# Patient Record
Sex: FEMALE | Race: WHITE | NOT HISPANIC OR LATINO | Employment: UNEMPLOYED | URBAN - METROPOLITAN AREA
[De-identification: names, ages, dates, MRNs, and addresses within clinical notes are randomized per-mention and may not be internally consistent; named-entity substitution may affect disease eponyms.]

---

## 2017-01-05 ENCOUNTER — HOSPITAL ENCOUNTER (EMERGENCY)
Facility: HOSPITAL | Age: 32
Discharge: HOME | End: 2017-01-05
Attending: EMERGENCY MEDICINE
Payer: MEDICAID

## 2017-01-05 VITALS
OXYGEN SATURATION: 99 % | RESPIRATION RATE: 17 BRPM | HEIGHT: 68 IN | SYSTOLIC BLOOD PRESSURE: 123 MMHG | TEMPERATURE: 99 F | DIASTOLIC BLOOD PRESSURE: 73 MMHG | BODY MASS INDEX: 25.76 KG/M2 | HEART RATE: 82 BPM | WEIGHT: 170 LBS

## 2017-01-05 DIAGNOSIS — N39.0 URINARY TRACT INFECTION, SITE UNSPECIFIED: ICD-10-CM

## 2017-01-05 DIAGNOSIS — R10.9 ABDOMINAL PAIN, UNSPECIFIED LOCATION: Primary | ICD-10-CM

## 2017-01-05 DIAGNOSIS — R11.10 VOMITING AND DIARRHEA: ICD-10-CM

## 2017-01-05 DIAGNOSIS — R19.7 VOMITING AND DIARRHEA: ICD-10-CM

## 2017-01-05 LAB
ALBUMIN SERPL BCP-MCNC: 4.8 G/DL
ALP SERPL-CCNC: 78 U/L
ALT SERPL W/O P-5'-P-CCNC: 29 U/L
ANION GAP SERPL CALC-SCNC: 14 MMOL/L
AST SERPL-CCNC: 17 U/L
B-HCG UR QL: NEGATIVE
BACTERIA #/AREA URNS HPF: ABNORMAL /HPF
BASOPHILS # BLD AUTO: 0.03 K/UL
BASOPHILS NFR BLD: 0.1 %
BILIRUB SERPL-MCNC: 0.5 MG/DL
BILIRUB UR QL STRIP: NEGATIVE
BUN SERPL-MCNC: 8 MG/DL
CALCIUM SERPL-MCNC: 10.4 MG/DL
CHLORIDE SERPL-SCNC: 106 MMOL/L
CLARITY UR: ABNORMAL
CO2 SERPL-SCNC: 20 MMOL/L
COLOR UR: YELLOW
CREAT SERPL-MCNC: 1 MG/DL
CTP QC/QA: YES
DIFFERENTIAL METHOD: ABNORMAL
EOSINOPHIL # BLD AUTO: 0 K/UL
EOSINOPHIL NFR BLD: 0 %
ERYTHROCYTE [DISTWIDTH] IN BLOOD BY AUTOMATED COUNT: 14.9 %
EST. GFR  (AFRICAN AMERICAN): >60 ML/MIN/1.73 M^2
EST. GFR  (NON AFRICAN AMERICAN): >60 ML/MIN/1.73 M^2
GLUCOSE SERPL-MCNC: 110 MG/DL
GLUCOSE UR QL STRIP: NEGATIVE
HCT VFR BLD AUTO: 44.4 %
HGB BLD-MCNC: 15.3 G/DL
HGB UR QL STRIP: ABNORMAL
HYALINE CASTS #/AREA URNS LPF: 0 /LPF
KETONES UR QL STRIP: ABNORMAL
LEUKOCYTE ESTERASE UR QL STRIP: ABNORMAL
LIPASE SERPL-CCNC: 19 U/L
LYMPHOCYTES # BLD AUTO: 2 K/UL
LYMPHOCYTES NFR BLD: 8.7 %
MCH RBC QN AUTO: 31.2 PG
MCHC RBC AUTO-ENTMCNC: 34.5 %
MCV RBC AUTO: 91 FL
MICROSCOPIC COMMENT: ABNORMAL
MONOCYTES # BLD AUTO: 1.4 K/UL
MONOCYTES NFR BLD: 6 %
NEUTROPHILS # BLD AUTO: 19.9 K/UL
NEUTROPHILS NFR BLD: 85.2 %
NITRITE UR QL STRIP: NEGATIVE
PH UR STRIP: 8 [PH] (ref 5–8)
PLATELET # BLD AUTO: 388 K/UL
PMV BLD AUTO: 11.4 FL
POTASSIUM SERPL-SCNC: 3.7 MMOL/L
PROT SERPL-MCNC: 8.7 G/DL
PROT UR QL STRIP: ABNORMAL
RBC # BLD AUTO: 4.9 M/UL
RBC #/AREA URNS HPF: 12 /HPF (ref 0–4)
SODIUM SERPL-SCNC: 140 MMOL/L
SP GR UR STRIP: 1.02 (ref 1–1.03)
SQUAMOUS #/AREA URNS HPF: 10 /HPF
URN SPEC COLLECT METH UR: ABNORMAL
UROBILINOGEN UR STRIP-ACNC: ABNORMAL EU/DL
WBC # BLD AUTO: 23.36 K/UL
WBC #/AREA URNS HPF: 5 /HPF (ref 0–5)
YEAST URNS QL MICRO: ABNORMAL

## 2017-01-05 PROCEDURE — 87186 SC STD MICRODIL/AGAR DIL: CPT

## 2017-01-05 PROCEDURE — 96365 THER/PROPH/DIAG IV INF INIT: CPT

## 2017-01-05 PROCEDURE — 87088 URINE BACTERIA CULTURE: CPT

## 2017-01-05 PROCEDURE — 80053 COMPREHEN METABOLIC PANEL: CPT

## 2017-01-05 PROCEDURE — 96368 THER/DIAG CONCURRENT INF: CPT

## 2017-01-05 PROCEDURE — 81025 URINE PREGNANCY TEST: CPT | Performed by: EMERGENCY MEDICINE

## 2017-01-05 PROCEDURE — 81000 URINALYSIS NONAUTO W/SCOPE: CPT

## 2017-01-05 PROCEDURE — 63600175 PHARM REV CODE 636 W HCPCS: Performed by: PHYSICIAN ASSISTANT

## 2017-01-05 PROCEDURE — 87086 URINE CULTURE/COLONY COUNT: CPT

## 2017-01-05 PROCEDURE — 85025 COMPLETE CBC W/AUTO DIFF WBC: CPT

## 2017-01-05 PROCEDURE — 25000003 PHARM REV CODE 250: Performed by: EMERGENCY MEDICINE

## 2017-01-05 PROCEDURE — 83690 ASSAY OF LIPASE: CPT

## 2017-01-05 PROCEDURE — 63600175 PHARM REV CODE 636 W HCPCS: Performed by: EMERGENCY MEDICINE

## 2017-01-05 PROCEDURE — 96361 HYDRATE IV INFUSION ADD-ON: CPT

## 2017-01-05 PROCEDURE — 25500020 PHARM REV CODE 255: Performed by: EMERGENCY MEDICINE

## 2017-01-05 PROCEDURE — 99284 EMERGENCY DEPT VISIT MOD MDM: CPT | Mod: 25

## 2017-01-05 PROCEDURE — 87077 CULTURE AEROBIC IDENTIFY: CPT

## 2017-01-05 PROCEDURE — 25000003 PHARM REV CODE 250: Performed by: PHYSICIAN ASSISTANT

## 2017-01-05 RX ORDER — ONDANSETRON 2 MG/ML
4 INJECTION INTRAMUSCULAR; INTRAVENOUS
Status: COMPLETED | OUTPATIENT
Start: 2017-01-05 | End: 2017-01-05

## 2017-01-05 RX ORDER — METRONIDAZOLE 500 MG/100ML
500 INJECTION, SOLUTION INTRAVENOUS
Status: COMPLETED | OUTPATIENT
Start: 2017-01-05 | End: 2017-01-05

## 2017-01-05 RX ORDER — HYDROMORPHONE HYDROCHLORIDE 2 MG/ML
1 INJECTION, SOLUTION INTRAMUSCULAR; INTRAVENOUS; SUBCUTANEOUS
Status: COMPLETED | OUTPATIENT
Start: 2017-01-05 | End: 2017-01-05

## 2017-01-05 RX ORDER — DICYCLOMINE HYDROCHLORIDE 20 MG/1
20 TABLET ORAL 2 TIMES DAILY
Qty: 20 TABLET | Refills: 0 | Status: SHIPPED | OUTPATIENT
Start: 2017-01-05 | End: 2017-02-04

## 2017-01-05 RX ORDER — PROMETHAZINE HYDROCHLORIDE 25 MG/1
25 TABLET ORAL EVERY 6 HOURS PRN
Qty: 15 TABLET | Refills: 0 | Status: SHIPPED | OUTPATIENT
Start: 2017-01-05 | End: 2017-01-25

## 2017-01-05 RX ORDER — CIPROFLOXACIN 2 MG/ML
400 INJECTION, SOLUTION INTRAVENOUS
Status: COMPLETED | OUTPATIENT
Start: 2017-01-05 | End: 2017-01-05

## 2017-01-05 RX ORDER — CEPHALEXIN 500 MG/1
500 CAPSULE ORAL 4 TIMES DAILY
Qty: 28 CAPSULE | Refills: 0 | Status: SHIPPED | OUTPATIENT
Start: 2017-01-05 | End: 2017-01-12

## 2017-01-05 RX ADMIN — PROMETHAZINE HYDROCHLORIDE 12.5 MG: 25 INJECTION INTRAMUSCULAR; INTRAVENOUS at 02:01

## 2017-01-05 RX ADMIN — IOHEXOL 70 ML: 350 INJECTION, SOLUTION INTRAVENOUS at 03:01

## 2017-01-05 RX ADMIN — SODIUM CHLORIDE 1000 ML: 0.9 INJECTION, SOLUTION INTRAVENOUS at 01:01

## 2017-01-05 RX ADMIN — CIPROFLOXACIN 400 MG: 2 INJECTION, SOLUTION INTRAVENOUS at 02:01

## 2017-01-05 RX ADMIN — METRONIDAZOLE 500 MG: 500 INJECTION, SOLUTION INTRAVENOUS at 03:01

## 2017-01-05 RX ADMIN — ONDANSETRON 4 MG: 2 INJECTION INTRAMUSCULAR; INTRAVENOUS at 01:01

## 2017-01-05 RX ADMIN — HYDROMORPHONE HYDROCHLORIDE 1 MG: 2 INJECTION, SOLUTION INTRAMUSCULAR; INTRAVENOUS; SUBCUTANEOUS at 02:01

## 2017-01-05 NOTE — ED AVS SNAPSHOT
[facility]  [place] [address]               Alena Willis   2017  1:28 AM   ED    Description:  Female : 1985   Department:  [facility]           Your Care was Coordinated By:     Provider Role From To    Ruth Yen MD Attending Provider 17 012 --    DANN Nance Physician Assistant 17 --      Reason for Visit     Abdominal Pain           Diagnoses this Visit        Comments    Abdominal pain, unspecified location    -  Primary     Vomiting and diarrhea         Urinary tract infection, site unspecified           ED Disposition     None           To Do List           Follow-up Information     Follow up with Keena Pillai NP.    Specialty:  Hospitalist    Contact information:    [facility][note 3] [place] [id number] [phone]         These Medications        Disp Refills Start End    cephALEXin (KEFLEX) 500 MG capsule 28 capsule 0 2017    Take 1 capsule (500 mg total) by mouth 4 (four) times daily. - Oral    Pharmacy: [facility] [place] [address] [address] Ph #: [phone]       promethazine (PHENERGAN) 25 MG tablet 15 tablet 0 2017     Take 1 tablet (25 mg total) by mouth every 6 (six) hours as needed for Nausea. - Oral    Ph[facility] [place] [address] [facility] Ph #: [phone]       dicyclomine (BENTYL) 20 mg tablet 20 tablet 0 2017    Take 1 tablet (20 mg total) by mouth 2 (two) times daily. - Oral    Pharmacy: [facility] [place] [address] [address] Ph #: [phone]         [facility]     [facility] -  Assistance  Registered nurses in the [facility] provide clinical advisement, health education, appointment booking, and other advisory services.  Call for this free service at [phone].             Medications            Message regarding Medications     Verify the changes and/or additions to your medication regime listed below are the same as discussed with your clinician today.  If any of these changes or additions are incorrect, please notify your healthcare provider.        START taking these NEW medications        Refills    cephALEXin (KEFLEX) 500 MG capsule 0    Sig: Take 1 capsule (500 mg total) by mouth 4 (four) times daily.    Class: Print    Route: Oral    promethazine (PHENERGAN) 25 MG tablet 0    Sig: Take 1 tablet (25 mg total) by mouth every 6 (six) hours as needed for Nausea.    Class: Print    Route: Oral    dicyclomine (BENTYL) 20 mg tablet 0    Sig: Take 1 tablet (20 mg total) by mouth 2 (two) times daily.    Class: Print    Route: Oral      These medications were administered today        Dose Freq    sodium chloride 0.9% bolus 1,000 mL 1,000 mL ED 1 Time    Sig: Inject 1,000 mLs into the vein ED 1 Time.    Class: Normal    Route: Intravenous    ondansetron injection 4 mg 4 mg ED 1 Time    Sig: Inject 4 mg into the vein ED 1 Time.    Class: Normal    Route: Intravenous    ciprofloxacin (CIPRO)400mg/200ml D5W IVPB 400 mg 400 mg ED 1 Time    Sig: Inject 200 mLs (400 mg total) into the vein ED 1 Time.    Class: Normal    Route: Intravenous    Cosign for Ordering: Accepted by Ruth Yen MD on 1/5/2017  3:12 AM    metronidazole IVPB 500 mg 500 mg ED 1 Time    Sig: Inject 100 mLs (500 mg total) into the vein ED 1 Time.    Class: Normal    Route: Intravenous    Cosign for Ordering: Accepted by Ruth Yen MD on 1/5/2017  3:12 AM    hydromorphone (PF) injection 1 mg 1 mg ED 1 Time    Sig: Inject 0.5 mLs (1 mg total) into the vein ED 1 Time.    Class: Normal    Route: Intravenous    Cosign for Ordering: Accepted by Ruth Yen MD on 1/5/2017  3:12 AM    promethazine (PHENERGAN) 12.5 mg in dextrose 5 % 50 mL IVPB 12.5 mg ED 1 Time    Sig: Inject 12.5 mg into the vein ED 1 Time.    Class: Normal  "   Route: Intravenous    Cosign for Ordering: Accepted by Ruth Yen MD on 1/5/2017  3:12 AM    omnipaque 350 iohexol 70 mL 70 mL IMG once as needed    Sig: Inject 70 mLs into the vein ONCE PRN for contrast.    Class: Normal    Route: Intravenous      STOP taking these medications     promethazine (PHENERGAN) 25 MG suppository Place 1 suppository (25 mg total) rectally every 6 (six) hours as needed for Nausea.           Verify that the below list of medications is an accurate representation of the medications you are currently taking.  If none reported, the list may be blank. If incorrect, please contact your healthcare provider. Carry this list with you in case of emergency.           Current Medications     alprazolam (XANAX) 0.5 MG tablet Take 1 tablet (0.5 mg total) by mouth 3 (three) times daily as needed for Anxiety.    cephALEXin (KEFLEX) 500 MG capsule Take 1 capsule (500 mg total) by mouth 4 (four) times daily.    dicyclomine (BENTYL) 20 mg tablet Take 1 tablet (20 mg total) by mouth 2 (two) times daily.    methocarbamol (ROBAXIN) 500 MG Tab Take 500 mg by mouth 4 (four) times daily.    metronidazole IVPB 500 mg Inject 100 mLs (500 mg total) into the vein ED 1 Time.    ondansetron (ZOFRAN) 4 MG tablet Take 1 tablet (4 mg total) by mouth every 8 (eight) hours as needed for Nausea.    ondansetron (ZOFRAN) 4 MG tablet Take 1 tablet (4 mg total) by mouth every 8 (eight) hours as needed.    oxycodone-acetaminophen (PERCOCET) 5-325 mg per tablet Take 1 tablet by mouth every 4 (four) hours as needed for Pain.    promethazine (PHENERGAN) 25 MG tablet Take 1 tablet (25 mg total) by mouth every 6 (six) hours as needed for Nausea.    tramadol (ULTRAM) 50 mg tablet Take 50 mg by mouth every 6 (six) hours as needed for Pain.           Clinical Reference Information           Your Vitals Were     BP Pulse Temp Resp Height Weight    124/73 83 99 °F (37.2 °C) (Oral) 17 5' 8" (1.727 m) 77.1 kg (170 lb)    SpO2 BMI " "            99% 25.85 kg/m2         Allergies as of 1/5/2017        Reactions    Percocet [Oxycodone-acetaminophen] Other (See Comments)    nausea, abdominal cramping - "just doesn't like taking it" - patient took morning of 2/2/2016      Immunizations Administered on Date of Encounter - 1/5/2017     None      ED Micro, Lab, POCT     Start Ordered       Status Ordering Provider    01/05/17 0207 01/05/17 0206  Urine culture  Once      In process     01/05/17 0032 01/05/17 0032  Comprehensive metabolic panel  STAT      Final result     01/05/17 0032 01/05/17 0032  CBC auto differential  STAT      Final result     01/05/17 0032 01/05/17 0032  Urinalysis  STAT      Final result     01/05/17 0032 01/05/17 0032  Lipase  STAT      Final result     01/05/17 0032 01/05/17 0032  POCT urine pregnancy  Once      Final result     01/05/17 0032 01/05/17 0032  Urinalysis Microscopic  Once      Final result       ED Imaging Orders     Start Ordered       Status Ordering Provider    01/05/17 0220 01/05/17 0220  CT Abdomen Pelvis With Contrast  1 time imaging      Final result         Discharge Instructions         Abdominal Pain    Abdominal pain is pain in the stomach or belly area. Everyone has this pain from time to time. In many cases it goes away on its own. But abdominal pain can sometimes be due to a serious problem, such as appendicitis. So its important to know when to seek help.  Causes of abdominal pain  There are many possible causes of abdominal pain. Common causes in adults include:  · Constipation, diarrhea, or gas  · Stomach acid flowing back up into the esophagus (acid reflux or heartburn)  · Severe acid reflux, called GERD (gastroesophageal reflux disease)  · A sore in the lining of the stomach or small intestine (peptic ulcer)  · Inflammation of the gallbladder, liver, or pancreas  · Gallstones or kidney stones  · Appendicitis   · Intestinal blockage   · An internal organ pushing through a muscle or other tissue " (hernia)  · Urinary tract infections  · In women, menstrual cramps, fibroids, or endometriosis  · Inflammation or infection of the intestines  Diagnosing the cause of abdominal pain  Your healthcare provider will do a physical exam help find the cause of your pain. If needed, tests will be ordered. Belly pain has many possible causes. So it can be hard to find the reason for your pain. Giving details about your pain can help. Tell your provider where and when you feel the pain, and what makes it better or worse. Also let your provider know if you have other symptoms such as:  · Fever  · Tiredness  · Upset stomach (nausea)  · Vomiting  · Changes in bathroom habits  Treating abdominal pain  Some causes of pain need emergency medical treatment right away. These include appendicitis or a bowel blockage. Other problems can be treated with rest, fluids, or medicines. Your healthcare provider can give you specific instructions for treatment or self-care based on what is causing your pain.  If you have vomiting or diarrhea, sip water or other clear fluids. When you are ready to eat solid foods again, start with small amounts of easy-to-digest, low-fat foods. These include apple sauce, toast, or crackers.   When to seek medical care  Call 911 or go to the hospital right away if you:  · Cant pass stool and are vomiting  · Are vomiting blood or have bloody diarrhea or black, tarry diarrhea  · Have chest, neck, or shoulder pain  · Feel like you might pass out  · Have pain in your shoulder blades with nausea  · Have sudden, severe belly pain  · Have new, severe pain unlike any you have felt before  · Have a belly that is rigid, hard, and tender to touch  Call your healthcare provider if you have:  · Pain for more than 5 days  · Bloating for more than 2 days  · Diarrhea for more than 5 days  · A fever of 100.4°F (38.0°C) or higher, or as directed by your provider  · Pain that gets worse  · Weight loss for no reason  · Continued  lack of appetite  · Blood in your stool  How to prevent abdominal pain  Here are some tips to help prevent abdominal pain:  · Eat smaller amounts of food at one time.  · Avoid greasy, fried, or other high-fat foods.  · Avoid foods that give you gas.  · Exercise regularly.  · Drink plenty of fluids.  To help prevent GERD symptoms:  · Quit smoking.  · Reduce alcohol and certain foods that increase stomach acid.  · Avoid aspirin and over-the-counter pain and fever medicines (NSAIDS or nonsteroidal anti-inflammatory drugs), if possible  · Lose extra weight.  · Finish eating at least 2 hours before you go to bed or lie down.  · Raise the head of your bed.  © 6183-2184 [employer]. [address] [place] [address], [place] [address]. All rights reserved. This information is not intended as a substitute for professional medical care. Always follow your healthcare professional's instructions.          Discharge References/Attachments     URINARY TRACT INFECTIONS IN WOMEN (ENGLISH)      [facility] Sign-Up     Activating your [facility] account is as easy as 1-2-3!     1) Visit [email or web address], select Sign Up Now, enter this activation code and your date of birth, then select Next.  [email or web address]  Expires: 2/19/2017  3:55 AM      2) Create a username and password to use when you visit [facility] in the future and select a security question in case you lose your password and select Next.    3) Enter your e-mail address and click Sign Up!    Additional Information  If you have questions, please e-mail [email or web address] or call [phone] to talk to our [facility] staff. Remember, [facility] is NOT to be used for urgent needs. For medical emergencies, dial 911.         Smoking Cessation     If you would like to quit smoking:   You may be eligible for free services if you are a [place] resident and started smoking cigarettes before September 1, 1988.  Call the Smoking Cessation Trust (SCT) toll free at (517) 797-4395  or (118) 997-4137.   Call 1-800-QUIT-NOW if you do not meet the above criteria.             [facility] complies with applicable Federal civil rights laws and does not discriminate on the basis of race, color, national origin, age, disability, or sex.        Language Assistance Services     ATTENTION: Language assistance services are available, free of charge. Please call [phone].      ATENCIÓN: Si habla español, tiene a coello disposición servicios gratuitos de asistencia lingüística. Llame al [phone].     CHÚ Ý: N?u b?n nói Ti?ng Vi?t, có các d?ch v? h? tr? ngôn ng? mi?n phí dành cho b?n. G?i s? [phone].

## 2017-01-05 NOTE — ED PROVIDER NOTES
"Encounter Date: 1/5/2017    SCRIBE #1 NOTE: I, Fred Julien, am scribing for, and in the presence of,  DANN Myers. I have scribed the following portions of the note - Other sections scribed: HPI, ROS.       History     Chief Complaint   Patient presents with    Abdominal Pain     Abd pain, vomiting, diarrhea, shaking X 3 days. Has hx of diverticulitis. Hands cramping     Review of patient's allergies indicates:   Allergen Reactions    Percocet [oxycodone-acetaminophen] Other (See Comments)     nausea, abdominal cramping - "just doesn't like taking it" - patient took morning of 2/2/2016       HPI Comments: CC: Abdominal Pain    HPI: 31 year old female with a history of diverticulitis and cervical cancer presents to the ED complaining of abdominal pain with nausea, vomiting, and diarrhea for about 3 days.  She also complains of dysuria for approximately 1 week. She states her abdominal pain is moderate 5/10 and worse with palpation. She otherwise denies fever, chills, chest pain, shortness of breath, constipation, and blood in her stool at this time. Symptoms are acute and constant. No prior treatment.    The history is provided by the patient.     Past Medical History   Diagnosis Date    Anxiety     Depression     History of diverticulitis     IBS (irritable bowel syndrome)      No past medical history pertinent negatives.  Past Surgical History   Procedure Laterality Date    Ovarian cyst removal       Family History   Problem Relation Age of Onset    Breast cancer Neg Hx     Colon cancer Neg Hx     Ovarian cancer Neg Hx      Social History   Substance Use Topics    Smoking status: Current Every Day Smoker     Packs/day: 1.00     Years: 14.00     Types: Cigarettes    Smokeless tobacco: Never Used    Alcohol use No     Review of Systems   Constitutional: Negative for chills and fever.   HENT: Negative for sore throat.    Eyes: Negative for visual disturbance.   Respiratory: Negative for " shortness of breath.    Cardiovascular: Negative for chest pain.   Gastrointestinal: Positive for abdominal pain, diarrhea, nausea and vomiting. Negative for blood in stool and constipation.   Genitourinary: Positive for dysuria.   Musculoskeletal: Negative for back pain.   Neurological: Negative for headaches.       Physical Exam   Initial Vitals   BP Pulse Resp Temp SpO2   01/05/17 0018 01/05/17 0018 01/05/17 0018 01/05/17 0018 01/05/17 0018   123/71 84 18 99 °F (37.2 °C) 96 %     Physical Exam    Constitutional: She appears well-developed and well-nourished.   HENT:   Head: Normocephalic.   Right Ear: External ear normal.   Left Ear: External ear normal.   Mouth/Throat: Oropharynx is clear and moist.   Eyes: Conjunctivae and EOM are normal. Pupils are equal, round, and reactive to light.   Neck: Normal range of motion. Neck supple.   Cardiovascular: Normal rate, regular rhythm, normal heart sounds and intact distal pulses.   Pulmonary/Chest: Breath sounds normal. No respiratory distress.   Abdominal: Soft. There is tenderness (left lower quadrant abdominal tenderness). There is no rebound and no guarding.   Neurological: She is alert and oriented to person, place, and time. She has normal strength.   Skin: Skin is warm.   Psychiatric: She has a normal mood and affect.         ED Course   Procedures  Labs Reviewed   COMPREHENSIVE METABOLIC PANEL - Abnormal; Notable for the following:        Result Value    CO2 20 (*)     Total Protein 8.7 (*)     All other components within normal limits   CBC W/ AUTO DIFFERENTIAL - Abnormal; Notable for the following:     WBC 23.36 (*)     MCH 31.2 (*)     RDW 14.9 (*)     Platelets 388 (*)     Gran # 19.9 (*)     Mono # 1.4 (*)     Gran% 85.2 (*)     Lymph% 8.7 (*)     All other components within normal limits   URINALYSIS - Abnormal; Notable for the following:     Appearance, UA Hazy (*)     Protein, UA 1+ (*)     Ketones, UA 2+ (*)     Occult Blood UA 1+ (*)     Urobilinogen,  UA 2.0-3.0 (*)     Leukocytes, UA 1+ (*)     All other components within normal limits   URINALYSIS MICROSCOPIC - Abnormal; Notable for the following:     RBC, UA 12 (*)     Bacteria, UA Many (*)     All other components within normal limits   CULTURE, URINE   LIPASE   POCT URINE PREGNANCY          X-Rays:   Independently Interpreted Readings:   Other Readings:  CT Abdomen Pelvis With Contrast (Final result) Result time: 01/05/17 03:22:22    Final result by Karlos Valiente MD (01/05/17 03:22:22)    Impression:       No CT evidence of acute diverticulitis.    No acute intra-abdominal or pelvic process.    Stable hepatic lesion.              Electronically signed by: KARLOS VALIENTE MD  Date: 01/05/17  Time: 03:22     Narrative:    Exam: [id number] 01/05/17  02:22:46 [id number] ([facility]) : CT ABDOMEN PELVIS WITH CONTRAST    Technique:     Axial CT Scan of the abdomen and pelvis was performed from the lung base to the public symphysis after the intravenous administration of  75 cc of Omni 350. Coronal and Sagittal reformats were obtained.     Comparison:    6/14/15 and prior examinations.    Findings:      The lung bases are within normal limits.  The heart is normal in appearance.  The vessels are unremarkable.  There is no evidence of lymphadenopathy.    The esophagus, stomach, gallbladder, and the small bowel are within normal limits.  The appendix is not consistently identified.  There are no secondary findings of acute appendicitis.  The large bowel is unremarkable with scattered colonic diverticula.  There is no evidence of acute diverticulitis.    There is a stable enhancing lesion in segment IVb of the liver.  The gallbladder and biliary tree are within normal limits.  The spleen, pancreas, and adrenal glands are within normal limits.    The kidneys, ureters, and urinary bladder are within normal limits.  The uterus is unremarkable.  The adnexal structures are within normal limits.    There is trace amount of fluid in the  pelvis.  There is no evidence of free air.  There is no evidence of pneumatosis.    The abdominal wall is within normal limits.  The osseous structures are unremarkable.        Medical Decision Making:   Initial Assessment:   31-year-old female with a past medical history of diverticulitis and cervical cancer presents complaining of abdominal pain for 3 days.  Associated nausea, vomiting, nonbloody diarrhea, and dysuria.  Patient is afebrile and nontoxic-appearing.  On exam patient has left lower quadrant abdominal tenderness.  CBC reveals leukocytosis 23.36.  No anemia.  CMP and lipase unremarkable.  Urinalysis reveals acute infection.  CT abdomen and pelvis shows no evidence of diverticulitis, intra-abdominal abscess, bowel obstruction, or pyelonephritis.  Given the above I suspect patient has a urinary tract infection and possibly a stomach virus.  I will discharge patient home on Keflex, Phenergan, and Bentyl.  Patient instructed to return to ED if symptoms worsen.  Patient stable for discharge.            Scribe Attestation:   Scribe #1: I performed the above scribed service and the documentation accurately describes the services I performed. I attest to the accuracy of the note.    Attending Attestation:     Physician Attestation Statement for NP/PA:   I discussed this assessment and plan of this patient with the NP/PA, but I did not personally examine the patient. The face to face encounter was performed by the NP/PA.    Other NP/PA Attestation Additions:    History of Present Illness: 32 yo female with LLQ pain x 3 days with N/V/D. Hx of diverticulitis.   Physical Exam: Tmax 99.   Medical Decision Making: Differential includes diverticulitis, appendicitis, STI, PID, TOA, ovarian torision, UTI, pyelonephritis, food poisoning, dehydration, DARÍO, other. Labs with WBC 23, UA 1+ leuks, otherwise unremarkable. CT abdom/pelvis showed NAD. Tx'ed with cipro/flagyl in ED pending CT and will be d/c'ed on cipro,  phenergan, bentyl. Return precautions discussed.       Physician Attestation for Scribe:  Physician Attestation Statement for Scribe #1: I, DANN Myers, reviewed documentation, as scribed by Fred Julien in my presence, and it is both accurate and complete.                 ED Course     Clinical Impression:   The primary encounter diagnosis was Abdominal pain, unspecified location. Diagnoses of Vomiting and diarrhea and Urinary tract infection, site unspecified were also pertinent to this visit.          DANN Nance  01/05/17 0546       Ruth Yen MD  01/07/17 0314

## 2017-01-05 NOTE — DISCHARGE INSTRUCTIONS
Abdominal Pain    Abdominal pain is pain in the stomach or belly area. Everyone has this pain from time to time. In many cases it goes away on its own. But abdominal pain can sometimes be due to a serious problem, such as appendicitis. So its important to know when to seek help.  Causes of abdominal pain  There are many possible causes of abdominal pain. Common causes in adults include:  · Constipation, diarrhea, or gas  · Stomach acid flowing back up into the esophagus (acid reflux or heartburn)  · Severe acid reflux, called GERD (gastroesophageal reflux disease)  · A sore in the lining of the stomach or small intestine (peptic ulcer)  · Inflammation of the gallbladder, liver, or pancreas  · Gallstones or kidney stones  · Appendicitis   · Intestinal blockage   · An internal organ pushing through a muscle or other tissue (hernia)  · Urinary tract infections  · In women, menstrual cramps, fibroids, or endometriosis  · Inflammation or infection of the intestines  Diagnosing the cause of abdominal pain  Your healthcare provider will do a physical exam help find the cause of your pain. If needed, tests will be ordered. Belly pain has many possible causes. So it can be hard to find the reason for your pain. Giving details about your pain can help. Tell your provider where and when you feel the pain, and what makes it better or worse. Also let your provider know if you have other symptoms such as:  · Fever  · Tiredness  · Upset stomach (nausea)  · Vomiting  · Changes in bathroom habits  Treating abdominal pain  Some causes of pain need emergency medical treatment right away. These include appendicitis or a bowel blockage. Other problems can be treated with rest, fluids, or medicines. Your healthcare provider can give you specific instructions for treatment or self-care based on what is causing your pain.  If you have vomiting or diarrhea, sip water or other clear fluids. When you are ready to eat solid foods again,  start with small amounts of easy-to-digest, low-fat foods. These include apple sauce, toast, or crackers.   When to seek medical care  Call 911 or go to the hospital right away if you:  · Cant pass stool and are vomiting  · Are vomiting blood or have bloody diarrhea or black, tarry diarrhea  · Have chest, neck, or shoulder pain  · Feel like you might pass out  · Have pain in your shoulder blades with nausea  · Have sudden, severe belly pain  · Have new, severe pain unlike any you have felt before  · Have a belly that is rigid, hard, and tender to touch  Call your healthcare provider if you have:  · Pain for more than 5 days  · Bloating for more than 2 days  · Diarrhea for more than 5 days  · A fever of 100.4°F (38.0°C) or higher, or as directed by your provider  · Pain that gets worse  · Weight loss for no reason  · Continued lack of appetite  · Blood in your stool  How to prevent abdominal pain  Here are some tips to help prevent abdominal pain:  · Eat smaller amounts of food at one time.  · Avoid greasy, fried, or other high-fat foods.  · Avoid foods that give you gas.  · Exercise regularly.  · Drink plenty of fluids.  To help prevent GERD symptoms:  · Quit smoking.  · Reduce alcohol and certain foods that increase stomach acid.  · Avoid aspirin and over-the-counter pain and fever medicines (NSAIDS or nonsteroidal anti-inflammatory drugs), if possible  · Lose extra weight.  · Finish eating at least 2 hours before you go to bed or lie down.  · Raise the head of your bed.  © 0505-0397 [employer]. [address] [place] [address], [place] [address]. All rights reserved. This information is not intended as a substitute for professional medical care. Always follow your healthcare professional's instructions.

## 2017-01-05 NOTE — ED NOTES
"Came to triage after triage completed and said "I forgot to tell you that I also have swollen legs"  "

## 2017-01-05 NOTE — ED TRIAGE NOTES
Patient presents with Abdominal Pain for the past three days. Patient also states she has been vomiting and diarrhea. Patient has a hx of diverticulitis. Pt also states her Hands are cramping.

## 2017-01-07 ENCOUNTER — HOSPITAL ENCOUNTER (EMERGENCY)
Facility: HOSPITAL | Age: 32
Discharge: HOME | End: 2017-01-07
Attending: EMERGENCY MEDICINE
Payer: MEDICAID

## 2017-01-07 VITALS
WEIGHT: 165 LBS | RESPIRATION RATE: 20 BRPM | OXYGEN SATURATION: 99 % | HEART RATE: 68 BPM | SYSTOLIC BLOOD PRESSURE: 120 MMHG | TEMPERATURE: 98 F | HEIGHT: 68 IN | DIASTOLIC BLOOD PRESSURE: 55 MMHG | BODY MASS INDEX: 25.01 KG/M2

## 2017-01-07 DIAGNOSIS — T78.40XA ALLERGIC REACTION, INITIAL ENCOUNTER: Primary | ICD-10-CM

## 2017-01-07 LAB
ALBUMIN SERPL BCP-MCNC: 4.8 G/DL
ALP SERPL-CCNC: 73 U/L
ALT SERPL W/O P-5'-P-CCNC: 24 U/L
ANION GAP SERPL CALC-SCNC: 15 MMOL/L
AST SERPL-CCNC: 18 U/L
B-HCG UR QL: NEGATIVE
BACTERIA UR CULT: NORMAL
BASOPHILS # BLD AUTO: 0.06 K/UL
BASOPHILS NFR BLD: 0.5 %
BILIRUB SERPL-MCNC: 0.7 MG/DL
BUN SERPL-MCNC: 11 MG/DL
CALCIUM SERPL-MCNC: 10.2 MG/DL
CHLORIDE SERPL-SCNC: 106 MMOL/L
CO2 SERPL-SCNC: 19 MMOL/L
CREAT SERPL-MCNC: 0.9 MG/DL
CTP QC/QA: YES
DIFFERENTIAL METHOD: ABNORMAL
EOSINOPHIL # BLD AUTO: 0.1 K/UL
EOSINOPHIL NFR BLD: 0.6 %
ERYTHROCYTE [DISTWIDTH] IN BLOOD BY AUTOMATED COUNT: 15.3 %
EST. GFR  (AFRICAN AMERICAN): >60 ML/MIN/1.73 M^2
EST. GFR  (NON AFRICAN AMERICAN): >60 ML/MIN/1.73 M^2
GLUCOSE SERPL-MCNC: 86 MG/DL
HCT VFR BLD AUTO: 44 %
HGB BLD-MCNC: 15.3 G/DL
LYMPHOCYTES # BLD AUTO: 2.1 K/UL
LYMPHOCYTES NFR BLD: 16.3 %
MCH RBC QN AUTO: 30.8 PG
MCHC RBC AUTO-ENTMCNC: 34.8 %
MCV RBC AUTO: 89 FL
MONOCYTES # BLD AUTO: 1.5 K/UL
MONOCYTES NFR BLD: 11.1 %
NEUTROPHILS # BLD AUTO: 9.3 K/UL
NEUTROPHILS NFR BLD: 71.3 %
PLATELET # BLD AUTO: 322 K/UL
PMV BLD AUTO: 11.5 FL
POTASSIUM SERPL-SCNC: 3.6 MMOL/L
PROT SERPL-MCNC: 8.6 G/DL
RBC # BLD AUTO: 4.96 M/UL
SODIUM SERPL-SCNC: 140 MMOL/L
WBC # BLD AUTO: 13.1 K/UL

## 2017-01-07 PROCEDURE — 80053 COMPREHEN METABOLIC PANEL: CPT

## 2017-01-07 PROCEDURE — 85025 COMPLETE CBC W/AUTO DIFF WBC: CPT

## 2017-01-07 PROCEDURE — 25000003 PHARM REV CODE 250: Performed by: PHYSICIAN ASSISTANT

## 2017-01-07 PROCEDURE — 63600175 PHARM REV CODE 636 W HCPCS: Performed by: PHYSICIAN ASSISTANT

## 2017-01-07 PROCEDURE — 81025 URINE PREGNANCY TEST: CPT | Performed by: PHYSICIAN ASSISTANT

## 2017-01-07 PROCEDURE — 96374 THER/PROPH/DIAG INJ IV PUSH: CPT

## 2017-01-07 PROCEDURE — 99284 EMERGENCY DEPT VISIT MOD MDM: CPT | Mod: 25

## 2017-01-07 PROCEDURE — 96375 TX/PRO/DX INJ NEW DRUG ADDON: CPT

## 2017-01-07 RX ORDER — NITROFURANTOIN 25; 75 MG/1; MG/1
100 CAPSULE ORAL 2 TIMES DAILY
Qty: 10 CAPSULE | Refills: 0 | Status: SHIPPED | OUTPATIENT
Start: 2017-01-07 | End: 2017-01-12

## 2017-01-07 RX ORDER — PREDNISONE 20 MG/1
60 TABLET ORAL DAILY
Qty: 15 TABLET | Refills: 0 | Status: SHIPPED | OUTPATIENT
Start: 2017-01-07 | End: 2017-01-12

## 2017-01-07 RX ORDER — DIPHENHYDRAMINE HYDROCHLORIDE 50 MG/ML
25 INJECTION INTRAMUSCULAR; INTRAVENOUS
Status: COMPLETED | OUTPATIENT
Start: 2017-01-07 | End: 2017-01-07

## 2017-01-07 RX ORDER — FAMOTIDINE 20 MG/1
20 TABLET, FILM COATED ORAL DAILY
Qty: 20 TABLET | Refills: 0 | Status: SHIPPED | OUTPATIENT
Start: 2017-01-07 | End: 2017-01-25

## 2017-01-07 RX ORDER — DIPHENHYDRAMINE HCL 25 MG
25 CAPSULE ORAL EVERY 6 HOURS PRN
Qty: 20 CAPSULE | Refills: 0 | Status: SHIPPED | OUTPATIENT
Start: 2017-01-07 | End: 2017-01-12

## 2017-01-07 RX ORDER — EPINEPHRINE 0.3 MG/.3ML
1 INJECTION SUBCUTANEOUS
Qty: 2 DEVICE | Refills: 1 | Status: SHIPPED | OUTPATIENT
Start: 2017-01-07 | End: 2017-01-25

## 2017-01-07 RX ORDER — FAMOTIDINE 20 MG/1
20 TABLET, FILM COATED ORAL
Status: COMPLETED | OUTPATIENT
Start: 2017-01-07 | End: 2017-01-07

## 2017-01-07 RX ORDER — METHYLPREDNISOLONE SODIUM SUCCINATE 125 MG/2ML
125 INJECTION INTRAMUSCULAR; INTRAVENOUS
Status: COMPLETED | OUTPATIENT
Start: 2017-01-07 | End: 2017-01-07

## 2017-01-07 RX ADMIN — DIPHENHYDRAMINE HYDROCHLORIDE 25 MG: 50 INJECTION, SOLUTION INTRAMUSCULAR; INTRAVENOUS at 02:01

## 2017-01-07 RX ADMIN — METHYLPREDNISOLONE SODIUM SUCCINATE 125 MG: 125 INJECTION, POWDER, FOR SOLUTION INTRAMUSCULAR; INTRAVENOUS at 02:01

## 2017-01-07 RX ADMIN — FAMOTIDINE 20 MG: 20 TABLET, FILM COATED ORAL at 04:01

## 2017-01-07 NOTE — ED PROVIDER NOTES
"Encounter Date: 1/7/2017    SCRIBE #1 NOTE: I, Sasha Adkins, am scribing for, and in the presence of,  Gemma Beckford PA-C. I have scribed the following portions of the note - Other sections scribed: HPI/ROS.       History     Chief Complaint   Patient presents with    Allergic Reaction     Started on Bentyl, Phenergan and Keflex yesterday. Woke up with lips swollen and hives, Denies resp distress or throat tightening.     Review of patient's allergies indicates:   Allergen Reactions    Percocet [oxycodone-acetaminophen] Other (See Comments)     nausea, abdominal cramping - "just doesn't like taking it" - patient took morning of 2/2/2016       HPI Comments: CC: Allergic Reaction    HPI: 31 y.o. Female with a PMHx of depression, anxiety, and IBS presents to the ED c/o a possible allergic reaction to new medications she started yesterday. Patient was seen at this facility 2 days ago and was prescribed keflex for UTI. She states she took first dose of keflex yesterday at lunchtime, then about 8 hours later experienced generalized numbness of her entire body. Pt states she then developed rash all over her body which had purulent drainage. Pt also reports her lips were swollen for which she took benadryl which mildly relieved it, but she states they are still swollen now. Patient reports associated anxiousness and mild "chest tightness". Pt denies throat swelling, trouble swallowing, fever, N/V and CP.     The history is provided by the patient.     Past Medical History   Diagnosis Date    Anxiety     Depression     History of diverticulitis     IBS (irritable bowel syndrome)      No past medical history pertinent negatives.  Past Surgical History   Procedure Laterality Date    Ovarian cyst removal       Family History   Problem Relation Age of Onset    Breast cancer Neg Hx     Colon cancer Neg Hx     Ovarian cancer Neg Hx      Social History   Substance Use Topics    Smoking status: Current Every Day Smoker    "  Packs/day: 1.00     Years: 14.00     Types: Cigarettes    Smokeless tobacco: Never Used    Alcohol use No     Review of Systems   Constitutional: Negative for chills and fever.   HENT: Negative for mouth sores, rhinorrhea and trouble swallowing.         (+) bilateral lip swelling  (-) throat swelling     Eyes: Negative for pain.   Respiratory: Positive for shortness of breath. Negative for cough and chest tightness.    Cardiovascular: Negative for chest pain.   Gastrointestinal: Negative for abdominal pain, nausea and vomiting.   Genitourinary: Negative for dysuria.   Musculoskeletal: Negative for neck pain.   Skin: Positive for rash (generalized, nonitchy).   Neurological: Positive for numbness. Negative for headaches.       Physical Exam   Initial Vitals   BP Pulse Resp Temp SpO2   01/07/17 1259 01/07/17 1259 01/07/17 1259 01/07/17 1259 01/07/17 1259   115/68 98 18 98.3 °F (36.8 °C) 97 %     Physical Exam    Nursing note and vitals reviewed.  Constitutional: She appears well-developed and well-nourished.   Patient appears anxious.   HENT:   Head: Normocephalic.   Right Ear: External ear normal.   Left Ear: External ear normal.   Nose: Nose normal.   Mouth/Throat: Uvula is midline and oropharynx is clear and moist. No posterior oropharyngeal edema or posterior oropharyngeal erythema.   Airway patent with no oropharyngeal edema. Mild swelling of top lip with 2 small ulcers to the oral mucosa of the top lip.    Eyes: Conjunctivae are normal.   Neck: Normal range of motion.   Cardiovascular: Normal rate and regular rhythm. Exam reveals no gallop and no friction rub.    No murmur heard.  Pulmonary/Chest: Effort normal. No stridor. No respiratory distress. She has no decreased breath sounds. She has wheezes in the right middle field, the left upper field, the left middle field and the left lower field. She has no rhonchi. She has no rales. She exhibits no tenderness.   Musculoskeletal: Normal range of motion.    Neurological: She is alert.   Skin: Skin is warm. Rash (Erythematous patch covering anterior chest. Few erythematous papules on back and posterior thighs with no drainage,) noted.         ED Course   Procedures  Labs Reviewed   POCT URINE PREGNANCY             Medical Decision Making:   Initial Assessment:   Patient is 32 y/o female with PMHx of asthma presents to ED today for allergic reaction associated with rash and lip swelling after taking keflex for UTI. Patient appears anxious but is not in respiratory distress. She is able to speak in clear sentences with no difficulty. Physical exam remarkable for mild inspiratory wheezing- pt is smoker- and airway was patent with no oropharyngeal edema. During stay in ED, IV was started, CBC and CMP drawn and pt was given famotidine, solumedrol, and diphenydramine.     Pt discharged to home with Prednisone, famotidine, benadryl for allergic reaction. Instructed patient to discontinue keflex and start Macrobid. Patient was given epipen as requested. Instructed patient to follow up with primary care or return to ED if symptoms worsen or if develop difficulty breathing.     I have discussed this patient with Dr. Rodgers who also saw this patient and she agrees with my assessment and plan.             Scribe Attestation:   Scribe #1: I performed the above scribed service and the documentation accurately describes the services I performed. I attest to the accuracy of the note.    Attending Attestation:     Physician Attestation Statement for NP/PA:   I have conducted a face to face encounter with this patient in addition to the NP/PA, due to    Other NP/PA Attestation Additions:    History of Present Illness: Lip swelling and rash--improved with benadryl       Physical Exam: NAD  Anxiety  Lip swelling     Medical Decision Making: I HAVE REVIEWED THE CASE WITH MY APC AND AGREE WITH THE HISTORY, ROS, PHYSICAL, ASSESSMENT AND PLAN OF CARE AS DOCUMENTED BY MY [facility]  CLINICIAN.      This was a 31-year-old female complaining of a diffuse rash over her torso and also lip swelling.  The patient states that her swelling was much worse this morning she took Benadryl and improved.  She is a smoker and occasionally wheezes.  Her vital signs are stable.  Sats are normal.  On exam, she is in no apparent distress but is anxious.  Comparing her 's license picture, the patient does have mild lip swelling.  There is no other oropharyngeal involvement.  She has no stridor.  There is some rhonchi in the left upper lobe.  I do not feel that this is anaphylaxis but rather due to smoking.  The rash on the torso is not consistent with urticaria.  They appear to be nonspecific papules.  The etiology is unclear.  Due to her complaints of swelling, an IV was established and she was treated with IV Benadryl and Solu-Medrol and Pepcid.  She continued to have improvement of her symptoms.  She was discharged with a prescription for prednisone, Benadryl, and Pepcid.  She requested an EpiPen.  She was advised to follow with her primary care doctor in 2 days for reevaluation.  She was also advised to follow with [facility] clinic.  She was discharged in stable condition.       Physician Attestation for Scribe:  Physician Attestation Statement for Scribe #1: I, Gemma Beckford PA-C, reviewed documentation, as scribed by Sasha Adkins in my presence, and it is both accurate and complete.                 ED Course     Clinical Impression:   There were no encounter diagnoses.    Disposition:   Disposition: Discharged  Condition: Stable       Gemma Beckford PA-C  01/07/17 1748       Stef Rodgers MD  01/08/17 2828

## 2017-01-07 NOTE — DISCHARGE INSTRUCTIONS
Drug Reaction: Allergic  You are having an allergic reaction to a drug you have taken. This causes an itchy rash and sometimes swelling of various parts of the body. It may also cause trouble swallowing or breathing. The rash may take a few hours or up to 2 weeks to go away. In the future, remember to tell your doctor about your allergy to this drug so that drugs of this type won't be used again.  Any medicine can cause an allergic reaction. However, penicillin and related drugs, sulfa drugs, aspirin, ibuprofen, and seizure medicines cause the most allergic reactions. Vaccines may also trigger allergies. People whose parents or siblings have allergies are at a higher risk of developing a drug allergy. Allergy testing may sometimes be required to determine the cause.  Symptoms may occur within minutes, hours, or even weeks after exposure to the drug. It can be a mild or severe reaction, or potentially life threatening. Most of us think of allergic reactions when we have a rash or itchy skin. Symptoms can include:  · Rash, hives, redness, welts, blisters  · Itching, burning, stinging, pain  · Dry, flaky, cracking, scaly skin  · Swelling of the face, lips or other parts of the body  · Fever.  Sometimes fever is the only symptom of a drug reaction.  In elderly people, the risk of fever increases with the number of drugs the older person takes.  More severe symptoms include:  · Trouble swallowing, feeling like your throat is closing  · Trouble breathing, wheezing  · Hoarse voice or trouble speaking  · Nausea, vomiting, diarrhea, stomach cramps  · Feeling faint or lightheaded, rapid heart rate  Home care    The goal of treatment is to help relieve the symptoms, and get you feeling better. Mild to moderate drug reactions usually respond quickly to antihistamines and steroids. The rash will usually fade over several days, but can sometimes last a couple of weeks. Over the next couple of days, there may be times when it is  gets a little worse, and then better again. Here are some things to do:  · Throw the drug away and do not take it again. The next reaction may be much worse.  · Add this drug reaction to your electronic medical record.  · When getting a new medicine, always tell the healthcare provider that you are allergic to this drug. Make certain they write it down in your medical record.  · Avoid tight clothing and anything that heats up your skin (hot showers/baths, direct sunlight) since heat will make itching worse.  · An ice pack (ice cubes in a plastic bag, wrapped in a thin towel, or a frozen bag of peas) will relieve local areas of intense itching and redness.  Don't put ice directly on the skin.  · Avoid scratching which may worsen the reaction, damage your skin and lead to an infection.  · Oral Benadryl (diphenhydramine) is an antihistamine available at drug and grocery stores. Unless a prescription antihistamine was given, Benadryl may be used to reduce itching if large areas of the skin are involved. It may make you sleepy, so be careful using it in the daytime or when going to school, working, or driving. [Note: Do not use Benadryl if you have glaucoma or if you are a man with trouble urinating due to an enlarged prostate.]  There are other antihistamines that cause less drowsiness and are a good alternative for daytime use. Ask your pharmacist for suggestions.  · Do not use Benadryl cream on your skin, because in some people it can cause a further reaction, and make you allergic to Benadryl.  · Calamine lotion or oatmeal baths sometimes help with itching.  Follow-up care  Follow up with your healthcare provider, or as advised if your symptoms do not continue to improve or they get worse.  Call 911  Call 911 if any of these occur:  · Trouble breathing or swallowing, wheezing  · New or worsening swelling in the mouth, throat, or tongue  · Hoarse voice or trouble speaking   · Fainting or loss of consciousness  · Rapid  heart rate  · Low blood pressure  · Feeling of doom  · Nausea, vomiting, abdominal pain, diarrhea  When to seek medical advice  Call your healthcare provider right away if any of these occur:  · Shortness of breath  · Increased swelling in the face, eyelids, or lips  · Dizziness, weakness  · Continuing or recurring symptoms  · Spreading areas of itching, redness or swelling  · Signs of infection:  ¨ Spreading redness  ¨ Increased pain or swelling  ¨ Fever (1 degree above your normal temperature) lasting for 24 to 48 hours Or, whatever your healthcare provider told you to report based on your medical condition  ¨ Colored fluid draining from the inflamed area  © 0651-0912 [employer]. [address] [place] [address], [place] [address]. All rights reserved. This information is not intended as a substitute for professional medical care. Always follow your healthcare professional's instructions.

## 2017-01-07 NOTE — ED TRIAGE NOTES
Pt reports new prescription of cephalexin 500mg started yesterday and last pm noted hives/rash to body, took benadryl last pm, no relief in symptoms

## 2017-01-07 NOTE — ED AVS SNAPSHOT
[facility]  [place] [address]               Alena Willis   2017  1:11 PM   ED    Description:  Female : 1985   Department:  [facility]           Your Care was Coordinated By:     Provider Role From To    Stef Rodgers MD Attending Provider 17 [phone] --    Gemma Beckford PA-C Physician Assistant 17 [phone] --      Reason for Visit     Allergic Reaction           Diagnoses this Visit        Comments    Allergic reaction, initial encounter    -  Primary       ED Disposition     ED Disposition Condition Comment    Discharge             To Do List           Follow-up Information     Follow up with Keena Pillai NP In 2 days.    Specialty:  Hospitalist    Contact information:    [address] [place], [place] [address]ELIZ S[place] [address] [phone]          Follow up with [facility].    Specialty:  Emergency Medicine    Why:  If symptoms worsen, As needed    Contact information:    [place] [address] [phone]       These Medications        Disp Refills Start End    diphenhydrAMINE (BENADRYL) 25 mg capsule 20 capsule 0 2017    Take 1 each (25 mg total) by mouth every 6 (six) hours as needed for Allergies. - Oral    Pharmacy: [facility] [place] [address] Ph #: [phone]       famotidine (PEPCID) 20 MG tablet 20 tablet 0 2017    Take 1 tablet (20 mg total) by mouth once daily. - Oral    Pharmacy: [facility] [place] [address] Ph #: [phone]       predniSONE (DELTASONE) 20 MG tablet 15 tablet 0 2017    Take 3 tablets (60 mg total) by mouth once daily. - Oral    Pharmacy: [facility] [place] [address] Ph #: [phone]       nitrofurantoin, macrocrystal-monohydrate, (MACROBID) 100 MG capsule 10 capsule 0 2017     Take 1 capsule (100 mg total) by mouth 2 (two) times daily. - Oral    Pharmacy: [facility] [place] [address] Ph #: [phone]       epinephrine (EPIPEN) 0.3 mg/0.3 mL AtIn 2 Device 1 1/7/2017 1/7/2018    Inject 0.3 mLs (0.3 mg total) into the muscle as needed. - Intramuscular    Pharmacy: [facility] [place] Ph #: [phone]         [facility]     [facility] On Call Nurse Care Line - 24/7 Assistance  Registered nurses in the [facility] On Call Center provide clinical advisement, health education, appointment booking, and other advisory services.  Call for this free service at [phone].             Medications           Message regarding Medications     Verify the changes and/or additions to your medication regime listed below are the same as discussed with your clinician today.  If any of these changes or additions are incorrect, please notify your healthcare provider.        START taking these NEW medications        Refills    diphenhydrAMINE (BENADRYL) 25 mg capsule 0    Sig: Take 1 each (25 mg total) by mouth every 6 (six) hours as needed for Allergies.    Class: Print    Route: Oral    famotidine (PEPCID) 20 MG tablet 0    Sig: Take 1 tablet (20 mg total) by mouth once daily.    Class: Print    Route: Oral    predniSONE (DELTASONE) 20 MG tablet 0    Sig: Take 3 tablets (60 mg total) by mouth once daily.    Class: Print    Route: Oral    nitrofurantoin, macrocrystal-monohydrate, (MACROBID) 100 MG capsule 0    Sig: Take 1 capsule (100 mg total) by mouth 2 (two) times daily.    Class: Print    Route: Oral    epinephrine (EPIPEN) 0.3 mg/0.3 mL AtIn 1    Sig: Inject 0.3 mLs (0.3 mg total) into the muscle as needed.    Class: Print    Route: Intramuscular      These medications were administered today        Dose Freq    methylPREDNISolone sodium succinate injection 125 mg 125 mg ED 1 Time    Sig: Inject 125 mg into the vein ED 1  Time.    Class: Normal    Route: Intravenous    Cosign for Ordering: Required by Stef Rodgers MD    diphenhydrAMINE injection 25 mg 25 mg ED 1 Time    Sig: Inject 0.5 mLs (25 mg total) into the vein ED 1 Time.    Class: Normal    Route: Intravenous    Cosign for Ordering: Required by Stef Rodgers MD    famotidine tablet 20 mg 20 mg ED 1 Time    Sig: Take 1 tablet (20 mg total) by mouth ED 1 Time.    Class: Normal    Route: Oral    Cosign for Ordering: Required by Stef Rodgers MD           Verify that the below list of medications is an accurate representation of the medications you are currently taking.  If none reported, the list may be blank. If incorrect, please contact your healthcare provider. Carry this list with you in case of emergency.           Current Medications     cephALEXin (KEFLEX) 500 MG capsule Take 1 capsule (500 mg total) by mouth 4 (four) times daily.    dicyclomine (BENTYL) 20 mg tablet Take 1 tablet (20 mg total) by mouth 2 (two) times daily.    promethazine (PHENERGAN) 25 MG tablet Take 1 tablet (25 mg total) by mouth every 6 (six) hours as needed for Nausea.    alprazolam (XANAX) 0.5 MG tablet Take 1 tablet (0.5 mg total) by mouth 3 (three) times daily as needed for Anxiety.    diphenhydrAMINE (BENADRYL) 25 mg capsule Take 1 each (25 mg total) by mouth every 6 (six) hours as needed for Allergies.    epinephrine (EPIPEN) 0.3 mg/0.3 mL AtIn Inject 0.3 mLs (0.3 mg total) into the muscle as needed.    famotidine (PEPCID) 20 MG tablet Take 1 tablet (20 mg total) by mouth once daily.    famotidine tablet 20 mg Take 1 tablet (20 mg total) by mouth ED 1 Time.    methocarbamol (ROBAXIN) 500 MG Tab Take 500 mg by mouth 4 (four) times daily.    nitrofurantoin, macrocrystal-monohydrate, (MACROBID) 100 MG capsule Take 1 capsule (100 mg total) by mouth 2 (two) times daily.    ondansetron (ZOFRAN) 4 MG tablet Take 1 tablet (4 mg total) by mouth every 8 (eight) hours as needed for Nausea.    ondansetron  "(ZOFRAN) 4 MG tablet Take 1 tablet (4 mg total) by mouth every 8 (eight) hours as needed.    oxycodone-acetaminophen (PERCOCET) 5-325 mg per tablet Take 1 tablet by mouth every 4 (four) hours as needed for Pain.    predniSONE (DELTASONE) 20 MG tablet Take 3 tablets (60 mg total) by mouth once daily.    tramadol (ULTRAM) 50 mg tablet Take 50 mg by mouth every 6 (six) hours as needed for Pain.           Clinical Reference Information           Your Vitals Were     BP Pulse Temp Resp Height Weight    120/55 (BP Location: Right arm, Patient Position: Sitting, BP Method: Automatic) 68 98.3 °F (36.8 °C) (Oral) 20 5' 8" (1.727 m) 74.8 kg (165 lb)    SpO2 BMI             99% 25.09 kg/m2         Allergies as of 1/7/2017        Reactions    Percocet [Oxycodone-acetaminophen] Other (See Comments)    nausea, abdominal cramping - "just doesn't like taking it" - patient took morning of 2/2/2016      Immunizations Administered on Date of Encounter - 1/7/2017     None      ED Micro, Lab, POCT     Start Ordered       Status Ordering Provider    01/07/17 1358 01/07/17 1357  CBC auto differential  STAT      Final result     01/07/17 1358 01/07/17 1357  Comprehensive metabolic panel  STAT      Final result     01/07/17 1306 01/07/17 1305  POCT urine pregnancy  Once      Final result       ED Imaging Orders     None        Discharge Instructions         Drug Reaction: Allergic  You are having an allergic reaction to a drug you have taken. This causes an itchy rash and sometimes swelling of various parts of the body. It may also cause trouble swallowing or breathing. The rash may take a few hours or up to 2 weeks to go away. In the future, remember to tell your doctor about your allergy to this drug so that drugs of this type won't be used again.  Any medicine can cause an allergic reaction. However, penicillin and related drugs, sulfa drugs, aspirin, ibuprofen, and seizure medicines cause the most allergic reactions. Vaccines may also " trigger allergies. People whose parents or siblings have allergies are at a higher risk of developing a drug allergy. Allergy testing may sometimes be required to determine the cause.  Symptoms may occur within minutes, hours, or even weeks after exposure to the drug. It can be a mild or severe reaction, or potentially life threatening. Most of us think of allergic reactions when we have a rash or itchy skin. Symptoms can include:  · Rash, hives, redness, welts, blisters  · Itching, burning, stinging, pain  · Dry, flaky, cracking, scaly skin  · Swelling of the face, lips or other parts of the body  · Fever.  Sometimes fever is the only symptom of a drug reaction.  In elderly people, the risk of fever increases with the number of drugs the older person takes.  More severe symptoms include:  · Trouble swallowing, feeling like your throat is closing  · Trouble breathing, wheezing  · Hoarse voice or trouble speaking  · Nausea, vomiting, diarrhea, stomach cramps  · Feeling faint or lightheaded, rapid heart rate  Home care    The goal of treatment is to help relieve the symptoms, and get you feeling better. Mild to moderate drug reactions usually respond quickly to antihistamines and steroids. The rash will usually fade over several days, but can sometimes last a couple of weeks. Over the next couple of days, there may be times when it is gets a little worse, and then better again. Here are some things to do:  · Throw the drug away and do not take it again. The next reaction may be much worse.  · Add this drug reaction to your electronic medical record.  · When getting a new medicine, always tell the healthcare provider that you are allergic to this drug. Make certain they write it down in your medical record.  · Avoid tight clothing and anything that heats up your skin (hot showers/baths, direct sunlight) since heat will make itching worse.  · An ice pack (ice cubes in a plastic bag, wrapped in a thin towel, or a frozen  bag of peas) will relieve local areas of intense itching and redness.  Don't put ice directly on the skin.  · Avoid scratching which may worsen the reaction, damage your skin and lead to an infection.  · Oral Benadryl (diphenhydramine) is an antihistamine available at drug and grocery stores. Unless a prescription antihistamine was given, Benadryl may be used to reduce itching if large areas of the skin are involved. It may make you sleepy, so be careful using it in the daytime or when going to school, working, or driving. [Note: Do not use Benadryl if you have glaucoma or if you are a man with trouble urinating due to an enlarged prostate.]  There are other antihistamines that cause less drowsiness and are a good alternative for daytime use. Ask your pharmacist for suggestions.  · Do not use Benadryl cream on your skin, because in some people it can cause a further reaction, and make you allergic to Benadryl.  · Calamine lotion or oatmeal baths sometimes help with itching.  Follow-up care  Follow up with your healthcare provider, or as advised if your symptoms do not continue to improve or they get worse.  Call 911  Call 911 if any of these occur:  · Trouble breathing or swallowing, wheezing  · New or worsening swelling in the mouth, throat, or tongue  · Hoarse voice or trouble speaking   · Fainting or loss of consciousness  · Rapid heart rate  · Low blood pressure  · Feeling of doom  · Nausea, vomiting, abdominal pain, diarrhea  When to seek medical advice  Call your healthcare provider right away if any of these occur:  · Shortness of breath  · Increased swelling in the face, eyelids, or lips  · Dizziness, weakness  · Continuing or recurring symptoms  · Spreading areas of itching, redness or swelling  · Signs of infection:  ¨ Spreading redness  ¨ Increased pain or swelling  ¨ Fever (1 degree above your normal temperature) lasting for 24 to 48 hours Or, whatever your healthcare provider told you to report based on  your medical condition  ¨ Colored fluid draining from the inflamed area  © 9270-8465 [employer]. [address] [place] [address], [place] [address]. All rights reserved. This information is not intended as a substitute for professional medical care. Always follow your healthcare professional's instructions.          [facility] Sign-Up     Activating your [facility] account is as easy as 1-2-3!     1) Visit [email or web address], select Sign Up Now, enter this activation code and your date of birth, then select Next.  [email or web address]  Expires: 2/19/2017  3:55 AM      2) Create a username and password to use when you visit [facility] in the future and select a security question in case you lose your password and select Next.    3) Enter your e-mail address and click Sign Up!    Additional Information  If you have questions, please e-mail [email or web address] or call [phone] to talk to our [facility] staff. Remember, [facility] is NOT to be used for urgent needs. For medical emergencies, dial 911.         Smoking Cessation     If you would like to quit smoking:   You may be eligible for free services if you are a [place] resident and started smoking cigarettes before September 1, 1988.  Call the Smoking Cessation Trust (SCT) toll free at (518) 934-2989 or (514) 988-0552.   Call 1-800-QUIT-NOW if you do not meet the above criteria.             [facility] complies with applicable Federal civil rights laws and does not discriminate on the basis of race, color, national origin, age, disability, or sex.        Language Assistance Services     ATTENTION: Language assistance services are available, free of charge. Please call [phone].      ATENCIÓN: Si habla español, tiene a coello disposición servicios gratuitos de asistencia lingüística. Llame al [phone].     CHÚ Ý: N?u b?n nói Ti?ng Vi?t, có các d?ch v? h? tr? ngôn ng? mi?n phí dành cho b?n. G?i s? [phone].

## 2017-01-25 ENCOUNTER — HOSPITAL ENCOUNTER (EMERGENCY)
Facility: HOSPITAL | Age: 32
Discharge: HOME | End: 2017-01-25
Attending: EMERGENCY MEDICINE
Payer: MEDICAID

## 2017-01-25 VITALS
RESPIRATION RATE: 18 BRPM | SYSTOLIC BLOOD PRESSURE: 110 MMHG | OXYGEN SATURATION: 98 % | DIASTOLIC BLOOD PRESSURE: 86 MMHG | BODY MASS INDEX: 25.01 KG/M2 | HEIGHT: 68 IN | HEART RATE: 76 BPM | TEMPERATURE: 98 F | WEIGHT: 165 LBS

## 2017-01-25 DIAGNOSIS — K62.5 RECTAL BLEEDING: ICD-10-CM

## 2017-01-25 DIAGNOSIS — K64.4 EXTERNAL HEMORRHOID: ICD-10-CM

## 2017-01-25 DIAGNOSIS — K62.89 RECTAL PAIN: Primary | ICD-10-CM

## 2017-01-25 LAB
ALBUMIN SERPL BCP-MCNC: 3.9 G/DL
ALP SERPL-CCNC: 72 U/L
ALT SERPL W/O P-5'-P-CCNC: 13 U/L
ANION GAP SERPL CALC-SCNC: 6 MMOL/L
AST SERPL-CCNC: 17 U/L
B-HCG UR QL: NEGATIVE
BASOPHILS # BLD AUTO: 0.06 K/UL
BASOPHILS NFR BLD: 0.5 %
BILIRUB SERPL-MCNC: 0.2 MG/DL
BUN SERPL-MCNC: 14 MG/DL
CALCIUM SERPL-MCNC: 9.7 MG/DL
CHLORIDE SERPL-SCNC: 108 MMOL/L
CO2 SERPL-SCNC: 25 MMOL/L
CREAT SERPL-MCNC: 0.8 MG/DL
CTP QC/QA: YES
CTP QC/QA: YES
DIFFERENTIAL METHOD: ABNORMAL
EOSINOPHIL # BLD AUTO: 0.2 K/UL
EOSINOPHIL NFR BLD: 1.4 %
ERYTHROCYTE [DISTWIDTH] IN BLOOD BY AUTOMATED COUNT: 13.8 %
EST. GFR  (AFRICAN AMERICAN): >60 ML/MIN/1.73 M^2
EST. GFR  (NON AFRICAN AMERICAN): >60 ML/MIN/1.73 M^2
FECAL OCCULT BLOOD, POC: POSITIVE
GLUCOSE SERPL-MCNC: 86 MG/DL
HCT VFR BLD AUTO: 40.2 %
HGB BLD-MCNC: 13.5 G/DL
INR PPP: 1
LYMPHOCYTES # BLD AUTO: 2.7 K/UL
LYMPHOCYTES NFR BLD: 22.6 %
MCH RBC QN AUTO: 30.5 PG
MCHC RBC AUTO-ENTMCNC: 33.6 %
MCV RBC AUTO: 91 FL
MONOCYTES # BLD AUTO: 1.2 K/UL
MONOCYTES NFR BLD: 9.7 %
NEUTROPHILS # BLD AUTO: 7.9 K/UL
NEUTROPHILS NFR BLD: 65.6 %
PLATELET # BLD AUTO: 283 K/UL
PMV BLD AUTO: 12.1 FL
POTASSIUM SERPL-SCNC: 3.8 MMOL/L
PROT SERPL-MCNC: 7.5 G/DL
PROTHROMBIN TIME: 10.7 SEC
RBC # BLD AUTO: 4.43 M/UL
SODIUM SERPL-SCNC: 139 MMOL/L
WBC # BLD AUTO: 12 K/UL

## 2017-01-25 PROCEDURE — 85025 COMPLETE CBC W/AUTO DIFF WBC: CPT

## 2017-01-25 PROCEDURE — 80053 COMPREHEN METABOLIC PANEL: CPT

## 2017-01-25 PROCEDURE — 25000003 PHARM REV CODE 250: Performed by: EMERGENCY MEDICINE

## 2017-01-25 PROCEDURE — 63600175 PHARM REV CODE 636 W HCPCS: Performed by: PHYSICIAN ASSISTANT

## 2017-01-25 PROCEDURE — 99283 EMERGENCY DEPT VISIT LOW MDM: CPT | Mod: 25

## 2017-01-25 PROCEDURE — 96361 HYDRATE IV INFUSION ADD-ON: CPT

## 2017-01-25 PROCEDURE — 25000003 PHARM REV CODE 250: Performed by: PHYSICIAN ASSISTANT

## 2017-01-25 PROCEDURE — 96374 THER/PROPH/DIAG INJ IV PUSH: CPT

## 2017-01-25 PROCEDURE — 81025 URINE PREGNANCY TEST: CPT | Performed by: PHYSICIAN ASSISTANT

## 2017-01-25 PROCEDURE — 85610 PROTHROMBIN TIME: CPT

## 2017-01-25 RX ORDER — DOCUSATE SODIUM 100 MG/1
100 CAPSULE, LIQUID FILLED ORAL 3 TIMES DAILY PRN
Qty: 60 CAPSULE | Refills: 0 | Status: SHIPPED | OUTPATIENT
Start: 2017-01-25

## 2017-01-25 RX ORDER — DIPHENHYDRAMINE HCL 50 MG
50 CAPSULE ORAL
Status: COMPLETED | OUTPATIENT
Start: 2017-01-25 | End: 2017-01-25

## 2017-01-25 RX ORDER — ESCITALOPRAM OXALATE 20 MG/1
20 TABLET ORAL DAILY
COMMUNITY
End: 2017-08-14

## 2017-01-25 RX ORDER — HYDROMORPHONE HYDROCHLORIDE 2 MG/ML
0.25 INJECTION, SOLUTION INTRAMUSCULAR; INTRAVENOUS; SUBCUTANEOUS
Status: COMPLETED | OUTPATIENT
Start: 2017-01-25 | End: 2017-01-25

## 2017-01-25 RX ORDER — HYDROCORTISONE 25 MG/G
CREAM TOPICAL
Qty: 1 TUBE | Refills: 0 | Status: SHIPPED | OUTPATIENT
Start: 2017-01-25 | End: 2017-02-04

## 2017-01-25 RX ORDER — HYDROCODONE BITARTRATE AND ACETAMINOPHEN 5; 325 MG/1; MG/1
1 TABLET ORAL EVERY 6 HOURS PRN
Qty: 12 TABLET | Refills: 0 | Status: SHIPPED | OUTPATIENT
Start: 2017-01-25 | End: 2017-01-25 | Stop reason: CLARIF

## 2017-01-25 RX ORDER — LIDOCAINE HYDROCHLORIDE 20 MG/ML
JELLY TOPICAL
Status: COMPLETED | OUTPATIENT
Start: 2017-01-25 | End: 2017-01-25

## 2017-01-25 RX ADMIN — HYDROMORPHONE HYDROCHLORIDE 0.25 MG: 2 INJECTION INTRAMUSCULAR; INTRAVENOUS; SUBCUTANEOUS at 08:01

## 2017-01-25 RX ADMIN — SODIUM CHLORIDE 1000 ML: 0.9 INJECTION, SOLUTION INTRAVENOUS at 08:01

## 2017-01-25 RX ADMIN — DIPHENHYDRAMINE HYDROCHLORIDE 50 MG: 50 CAPSULE ORAL at 08:01

## 2017-01-25 RX ADMIN — LIDOCAINE HYDROCHLORIDE 10 ML: 20 JELLY TOPICAL at 11:01

## 2017-01-26 NOTE — ED TRIAGE NOTES
Pt states that noticed rectal bleeding this morning.  States that had rectal pain.  Pain rates as 7

## 2017-01-26 NOTE — DISCHARGE INSTRUCTIONS
The patient is discharged to home.  You are to follow up as directed above.  Rest.  Take an over-the-counter stool softener as directed by the manufacturers packaging for constipation.  Return to emergency department immediately for any new or worsening symptoms: fever, increased pain, persistent or worsening bleeding, weakness, dizziness, or any other concerns.

## 2017-01-26 NOTE — ED PROVIDER NOTES
"Encounter Date: 1/25/2017       History     Chief Complaint   Patient presents with    Rectal Pain     Pt reports history of hemorrhoids and  bright red rectal bleeding x 2 days.      Review of patient's allergies indicates:   Allergen Reactions    Keflex [cephalexin] Swelling and Rash    Percocet [oxycodone-acetaminophen] Other (See Comments)     nausea, abdominal cramping - "just doesn't like taking it" - patient took morning of 2/2/2016       HPI Comments: Historian: Patient  Chief complaint: Rectal pain  History of present illness: This 31-year-old female presents to the emergency department complaining of a 2 day history of rectal pain.  She has associated rectal bleeding.  Her pain is 8/10 and constant.  Touch aggravates her pain.  She has not had relief with preparation H or witch hazel.  Patient's last bowel movement was yesterday.  She is passing flatus.  Patient states she has IBS and alternates between constipation and diarrhea.  Patient denies fever, nausea, vomiting, abdominal pain, dysuria, hematuria, increased urinary frequency, dizziness, and weakness.    Past Medical History   Diagnosis Date    Anxiety     Asthma     Depression     History of diverticulitis     IBS (irritable bowel syndrome)      Past Medical History Pertinent Negatives   Diagnosis Date Noted    Diabetes mellitus 1/25/2017    Hypertension 1/25/2017    Seizures 1/25/2017     Past Surgical History   Procedure Laterality Date    Ovarian cyst removal      Cervix surgery       removal of tumor     Family History   Problem Relation Age of Onset    Breast cancer Neg Hx     Colon cancer Neg Hx     Ovarian cancer Neg Hx      Social History   Substance Use Topics    Smoking status: Current Every Day Smoker     Packs/day: 1.00     Years: 14.00     Types: Cigarettes    Smokeless tobacco: Never Used    Alcohol use No      Comment: rarely     Review of Systems   Constitutional: Negative for fever.   HENT: Negative for trouble " swallowing.    Respiratory: Negative for shortness of breath.    Gastrointestinal: Positive for blood in stool, constipation, diarrhea and rectal pain. Negative for abdominal pain, nausea and vomiting.   Genitourinary: Negative for difficulty urinating.   Musculoskeletal: Negative for gait problem.   Skin: Negative for rash.   Allergic/Immunologic: Negative for immunocompromised state.   Neurological: Negative for dizziness and seizures.   Psychiatric/Behavioral: Negative for confusion.       Physical Exam   Initial Vitals   BP Pulse Resp Temp SpO2   01/25/17 1846 01/25/17 1846 01/25/17 1846 01/25/17 1846 01/25/17 1846   134/71 92 18 98 °F (36.7 °C) 98 %     Physical Exam    Constitutional: She appears well-developed and well-nourished. She is cooperative.  Non-toxic appearance. No distress.   HENT:   Head: Normocephalic and atraumatic.   Mouth/Throat: Mucous membranes are normal. No trismus in the jaw.   Neck: Trachea normal, normal range of motion, full passive range of motion without pain and phonation normal. Neck supple. No stridor present.   Cardiovascular: Normal rate, regular rhythm and normal heart sounds. Exam reveals no gallop.    Pulmonary/Chest: Effort normal and breath sounds normal. No tachypnea. No respiratory distress. She has no decreased breath sounds. She has no wheezes. She has no rhonchi. She has no rales.   Abdominal: Soft. Normal appearance and bowel sounds are normal. There is no tenderness. There is no rigidity, no rebound, no guarding, no CVA tenderness, no tenderness at McBurney's point and negative Garcia's sign.   Genitourinary: Rectal exam shows external hemorrhoid (+small, non-bleeding, non-thrombosed), tenderness and guaiac positive stool. Rectal exam shows anal tone normal. Guaiac positive stool. : Acceptable.Pelvic exam was performed with patient in the knee-chest position.   Genitourinary Comments: Marli Patrick RN at bedside to chaperone rectal exam.  "  Neurological: She is alert and oriented to person, place, and time. She has normal strength. No sensory deficit.   Skin: Skin is warm, dry and intact. No rash noted.         ED Course   Procedures  Labs Reviewed   CBC W/ AUTO DIFFERENTIAL - Abnormal; Notable for the following:        Result Value    Gran # 7.9 (*)     Mono # 1.2 (*)     All other components within normal limits   COMPREHENSIVE METABOLIC PANEL - Abnormal; Notable for the following:     Anion Gap 6 (*)     All other components within normal limits   POCT OCCULT BLOOD STOOL - Abnormal; Notable for the following:     Fecal Occult Blood Positive (*)     All other components within normal limits   PROTIME-INR   POCT URINE PREGNANCY                Additional MDM:   Comments: 8:50 PM  Patient informed the nurse that she felt like her hands were "burning" after receiving IV Dilaudid.  There is no erythema, warmth, or rash noted.  The patient does not have any chest pain or shortness of breath.  There is no evidence of airway compromise.  Will order oral Benadryl.  9:25 PM  Patient much improved after Benadryl.  9:25 PM  MDM - patient with a 2 day history of rectal pain with associated rectal bleeding.  She is afebrile and nontoxic-appearing.  Patient denies abdominal pain, and her abdomen is soft nontender and without peritoneal signs.  On rectal exam she does have small, nonthrombosed external hemorrhoids without bleeding.  On digital rectal exam she does have gross blood.  CBC unremarkable - no leukocytosis, significant anemia, or thrombocytopenia.  CMP unremarkable - no renal insufficiency, transaminitis, or significant electrolyte imbalance.  At this time PT/INR are pending.  Patient had a CT scan of her abdomen and pelvis performed 01/5/17.  I reviewed this, and there is no evidence of acute abdominal process particularly no diverticulitis.  I had a careful discussion with the patient about the importance of gastroenterology follow-up.  She understood " that if her H&H were stable, that she would be discharged home with close gastroenterology follow-up.  She stated her understanding and agreement with this plan.  ED warnings and return instructions were discussed with her.  I discussed this case with Dr. Valadez, he is in agreement with this assessment and plan.  He will follow-up on her PT/INR and disposition her..                 ED Course     Clinical Impression:   The primary encounter diagnosis was Rectal pain. A diagnosis of Rectal bleeding was also pertinent to this visit.          DANN Kerns  01/25/17 6698

## 2017-06-08 ENCOUNTER — TELEPHONE (OUTPATIENT)
Dept: OBSTETRICS AND GYNECOLOGY | Facility: CLINIC | Age: 32
End: 2017-06-08

## 2017-06-08 NOTE — TELEPHONE ENCOUNTER
----- Message from Gila aCr sent at 6/6/2017 12:37 PM CDT -----  Contact: RACH FLORES [[id number]]  _x  1st Request  _  2nd Request  _  3rd Request        Who: RACH FLORES [[id number]]    Why: patient states she would like to schedule an initial ob appt. Patient was informed that th doctor does not accept medicaid. Patient states the doctor delivered her last baby 8 years ago and she would still like to see her. Please advise.     What Number to Call Back: [phone]    When to Expect a call back: (Before the end of the day)   -- if call after 3:00 call back will be tomorrow.

## 2017-06-08 NOTE — TELEPHONE ENCOUNTER
"Left message requesting call back at [phone]. Called to schedule an appt at [facility]. Since it has been 8 years since she has seen Dr. Bal, she would be a "new" pt, and she currently has Medicaid. Forwarded message to Ms. Zhao as [facility] for assistance with scheduling the pt.  "

## 2017-07-27 ENCOUNTER — TELEPHONE (OUTPATIENT)
Dept: OBSTETRICS AND GYNECOLOGY | Facility: CLINIC | Age: 32
End: 2017-07-27

## 2017-07-27 NOTE — TELEPHONE ENCOUNTER
----- Message from Gila Car sent at 7/27/2017 12:03 PM CDT -----  Contact: RACH FLORES [[id number]]  x_  1st Request  _  2nd Request  _  3rd Request        Who: RACH FLORES [[id number]]    Why: Patient 14 weeks would like to transfer prenatal care from [facility] in [place]. Patient would like a call back to schedule next routine ob appt.     What Number to Call Back: [phone]    When to Expect a call back: (Before the end of the day)   -- if call after 3:00 call back will be tomorrow.

## 2017-08-02 ENCOUNTER — INITIAL PRENATAL (OUTPATIENT)
Dept: OBSTETRICS AND GYNECOLOGY | Facility: CLINIC | Age: 32
End: 2017-08-02
Payer: MEDICAID

## 2017-08-02 VITALS
BODY MASS INDEX: 28.69 KG/M2 | DIASTOLIC BLOOD PRESSURE: 60 MMHG | WEIGHT: 188.69 LBS | SYSTOLIC BLOOD PRESSURE: 112 MMHG

## 2017-08-02 DIAGNOSIS — O99.342: ICD-10-CM

## 2017-08-02 DIAGNOSIS — J45.909 MATERNAL ASTHMA COMPLICATING PREGNANCY: ICD-10-CM

## 2017-08-02 DIAGNOSIS — Z34.02 ENCOUNTER FOR SUPERVISION OF NORMAL FIRST PREGNANCY IN SECOND TRIMESTER: ICD-10-CM

## 2017-08-02 DIAGNOSIS — O99.519 MATERNAL ASTHMA COMPLICATING PREGNANCY: ICD-10-CM

## 2017-08-02 PROBLEM — Z34.00 PREGNANCY, SUPERVISION, NORMAL, FIRST: Status: ACTIVE | Noted: 2017-08-02

## 2017-08-02 PROBLEM — O99.340 MENTAL DISORDER AFFECTING PREGNANCY: Status: ACTIVE | Noted: 2017-08-02

## 2017-08-02 PROCEDURE — 88175 CYTOPATH C/V AUTO FLUID REDO: CPT

## 2017-08-02 PROCEDURE — 99202 OFFICE O/P NEW SF 15 MIN: CPT | Mod: TH,S$PBB,, | Performed by: OBSTETRICS & GYNECOLOGY

## 2017-08-02 PROCEDURE — 87186 SC STD MICRODIL/AGAR DIL: CPT

## 2017-08-02 PROCEDURE — 87088 URINE BACTERIA CULTURE: CPT

## 2017-08-02 PROCEDURE — 99213 OFFICE O/P EST LOW 20 MIN: CPT | Mod: PBBFAC,PO | Performed by: OBSTETRICS & GYNECOLOGY

## 2017-08-02 PROCEDURE — 99999 PR PBB SHADOW E&M-EST. PATIENT-LVL III: CPT | Mod: PBBFAC,,, | Performed by: OBSTETRICS & GYNECOLOGY

## 2017-08-02 PROCEDURE — 87086 URINE CULTURE/COLONY COUNT: CPT

## 2017-08-02 PROCEDURE — 87591 N.GONORRHOEAE DNA AMP PROB: CPT

## 2017-08-02 PROCEDURE — 87077 CULTURE AEROBIC IDENTIFY: CPT

## 2017-08-02 RX ORDER — FLUCONAZOLE 150 MG/1
TABLET ORAL
COMMUNITY
Start: 2017-07-25 | End: 2017-10-19

## 2017-08-02 RX ORDER — PNV NO.95/FERROUS FUM/FOLIC AC 28MG-0.8MG
TABLET ORAL
COMMUNITY
Start: 2017-07-28 | End: 2017-08-14 | Stop reason: SDUPTHER

## 2017-08-02 RX ORDER — AMOXICILLIN 500 MG/1
500 CAPSULE ORAL EVERY 12 HOURS
COMMUNITY
Start: 2017-07-25 | End: 2017-08-14 | Stop reason: ALTCHOICE

## 2017-08-02 RX ORDER — ALBUTEROL SULFATE 90 UG/1
2 AEROSOL, METERED RESPIRATORY (INHALATION) EVERY 6 HOURS PRN
Refills: 6 | COMMUNITY
Start: 2017-07-05

## 2017-08-02 NOTE — PROGRESS NOTES
Complaints today: none  Good fm.  Denies ctx, vb, lof.      /60   Wt 85.6 kg (188 lb 11.4 oz)   LMP 2017   BMI 28.69 kg/m²     32 y.o., at 15w1d by Estimated Date of Delivery: 18  Patient Active Problem List   Diagnosis    Anxiety and depression    Pregnancy, supervision, normal, first    Maternal asthma complicating pregnancy    Mental disorder affecting pregnancy     OB History    Para Term  AB Living   3 1     1     SAB TAB Ectopic Multiple Live Births   1              # Outcome Date GA Lbr Darrell/2nd Weight Sex Delivery Anes PTL Lv   3 Current            2 SAB            1 Para                   Dating reviewed    Allergies and problem list reviewed and updated    Medical and surgical history reviewed    Prenatal labs reviewed and updated    Physical Exam:  ABD: soft, gravid, nontender,     Assessment:  Alena MANNING was seen today for routine prenatal visit, vaginal discharge and morning sickness.    Diagnoses and all orders for this visit:    Encounter for supervision of normal first pregnancy in second trimester  -     US MFM Procedure (Viewpoint); Future  -     Maternal Quad Screen; Future  -     C. trachomatis/N. gonorrhoeae by AMP DNA Cervix  -     Urine culture  -     Liquid-based pap smear, screening    Maternal asthma complicating pregnancy    Mental disorder affecting pregnancy, second trimester         Plan:   Counseled, desires quad.  Anatomy ordered   follow up 4 Weeks, kick counts, labor precautions

## 2017-08-03 LAB
C TRACH DNA SPEC QL NAA+PROBE: NOT DETECTED
N GONORRHOEA DNA SPEC QL NAA+PROBE: NOT DETECTED

## 2017-08-07 LAB — BACTERIA UR CULT: NORMAL

## 2017-08-14 ENCOUNTER — HOSPITAL ENCOUNTER (EMERGENCY)
Facility: OTHER | Age: 32
Discharge: HOME | End: 2017-08-14
Attending: EMERGENCY MEDICINE
Payer: MEDICAID

## 2017-08-14 VITALS
BODY MASS INDEX: 28.04 KG/M2 | OXYGEN SATURATION: 99 % | HEART RATE: 70 BPM | TEMPERATURE: 98 F | SYSTOLIC BLOOD PRESSURE: 85 MMHG | HEIGHT: 68 IN | RESPIRATION RATE: 16 BRPM | WEIGHT: 185 LBS | DIASTOLIC BLOOD PRESSURE: 47 MMHG

## 2017-08-14 DIAGNOSIS — G43.009 MIGRAINE WITHOUT AURA AND WITHOUT STATUS MIGRAINOSUS, NOT INTRACTABLE: Primary | ICD-10-CM

## 2017-08-14 DIAGNOSIS — N30.01 ACUTE CYSTITIS WITH HEMATURIA: ICD-10-CM

## 2017-08-14 DIAGNOSIS — D64.9 ANEMIA, UNSPECIFIED: ICD-10-CM

## 2017-08-14 DIAGNOSIS — R80.9 PROTEINURIA, UNSPECIFIED TYPE: ICD-10-CM

## 2017-08-14 LAB
ALBUMIN SERPL BCP-MCNC: 3 G/DL
ALP SERPL-CCNC: 59 U/L
ALT SERPL W/O P-5'-P-CCNC: 10 U/L
ANION GAP SERPL CALC-SCNC: 11 MMOL/L
AST SERPL-CCNC: 18 U/L
B-HCG UR QL: POSITIVE
BACTERIA #/AREA URNS HPF: ABNORMAL /HPF
BASOPHILS # BLD AUTO: 0.02 K/UL
BASOPHILS NFR BLD: 0.1 %
BILIRUB SERPL-MCNC: 0.2 MG/DL
BILIRUB UR QL STRIP: NEGATIVE
BUN SERPL-MCNC: 6 MG/DL
CALCIUM SERPL-MCNC: 8.9 MG/DL
CHLORIDE SERPL-SCNC: 106 MMOL/L
CLARITY UR: ABNORMAL
CO2 SERPL-SCNC: 19 MMOL/L
COLOR UR: YELLOW
CREAT SERPL-MCNC: 0.6 MG/DL
CTP QC/QA: YES
DIFFERENTIAL METHOD: ABNORMAL
EOSINOPHIL # BLD AUTO: 0.1 K/UL
EOSINOPHIL NFR BLD: 0.6 %
ERYTHROCYTE [DISTWIDTH] IN BLOOD BY AUTOMATED COUNT: 13.4 %
EST. GFR  (AFRICAN AMERICAN): >60 ML/MIN/1.73 M^2
EST. GFR  (NON AFRICAN AMERICAN): >60 ML/MIN/1.73 M^2
GLUCOSE SERPL-MCNC: 74 MG/DL
GLUCOSE UR QL STRIP: NEGATIVE
HCT VFR BLD AUTO: 35.8 %
HGB BLD-MCNC: 11.9 G/DL
HGB UR QL STRIP: ABNORMAL
KETONES UR QL STRIP: NEGATIVE
LEUKOCYTE ESTERASE UR QL STRIP: ABNORMAL
LYMPHOCYTES # BLD AUTO: 1.3 K/UL
LYMPHOCYTES NFR BLD: 8.1 %
MCH RBC QN AUTO: 30.7 PG
MCHC RBC AUTO-ENTMCNC: 33.2 G/DL
MCV RBC AUTO: 93 FL
MICROSCOPIC COMMENT: ABNORMAL
MONOCYTES # BLD AUTO: 0.9 K/UL
MONOCYTES NFR BLD: 5.4 %
NEUTROPHILS # BLD AUTO: 13.5 K/UL
NEUTROPHILS NFR BLD: 85.5 %
NITRITE UR QL STRIP: POSITIVE
NON-SQ EPI CELLS #/AREA URNS HPF: 3 /HPF
PH UR STRIP: 6 [PH] (ref 5–8)
PLATELET # BLD AUTO: 218 K/UL
PMV BLD AUTO: 12.1 FL
POCT GLUCOSE: 82 MG/DL (ref 70–110)
POTASSIUM SERPL-SCNC: 4 MMOL/L
PROT SERPL-MCNC: 7.1 G/DL
PROT UR QL STRIP: ABNORMAL
RBC # BLD AUTO: 3.87 M/UL
RBC #/AREA URNS HPF: 2 /HPF (ref 0–4)
SODIUM SERPL-SCNC: 136 MMOL/L
SP GR UR STRIP: 1.02 (ref 1–1.03)
SQUAMOUS #/AREA URNS HPF: 4 /HPF
URN SPEC COLLECT METH UR: ABNORMAL
UROBILINOGEN UR STRIP-ACNC: NEGATIVE EU/DL
WBC # BLD AUTO: 15.79 K/UL
WBC #/AREA URNS HPF: 10 /HPF (ref 0–5)

## 2017-08-14 PROCEDURE — 85025 COMPLETE CBC W/AUTO DIFF WBC: CPT

## 2017-08-14 PROCEDURE — 99285 EMERGENCY DEPT VISIT HI MDM: CPT | Mod: 25

## 2017-08-14 PROCEDURE — 82962 GLUCOSE BLOOD TEST: CPT

## 2017-08-14 PROCEDURE — 87186 SC STD MICRODIL/AGAR DIL: CPT

## 2017-08-14 PROCEDURE — 87077 CULTURE AEROBIC IDENTIFY: CPT

## 2017-08-14 PROCEDURE — 87086 URINE CULTURE/COLONY COUNT: CPT

## 2017-08-14 PROCEDURE — 63600175 PHARM REV CODE 636 W HCPCS: Performed by: EMERGENCY MEDICINE

## 2017-08-14 PROCEDURE — 81000 URINALYSIS NONAUTO W/SCOPE: CPT

## 2017-08-14 PROCEDURE — 25000003 PHARM REV CODE 250: Performed by: EMERGENCY MEDICINE

## 2017-08-14 PROCEDURE — 96375 TX/PRO/DX INJ NEW DRUG ADDON: CPT

## 2017-08-14 PROCEDURE — 96361 HYDRATE IV INFUSION ADD-ON: CPT

## 2017-08-14 PROCEDURE — 80053 COMPREHEN METABOLIC PANEL: CPT

## 2017-08-14 PROCEDURE — 87088 URINE BACTERIA CULTURE: CPT

## 2017-08-14 PROCEDURE — 81025 URINE PREGNANCY TEST: CPT | Performed by: EMERGENCY MEDICINE

## 2017-08-14 PROCEDURE — 96374 THER/PROPH/DIAG INJ IV PUSH: CPT

## 2017-08-14 RX ORDER — DIPHENHYDRAMINE HYDROCHLORIDE 50 MG/ML
25 INJECTION INTRAMUSCULAR; INTRAVENOUS
Status: COMPLETED | OUTPATIENT
Start: 2017-08-14 | End: 2017-08-14

## 2017-08-14 RX ORDER — BUTALBITAL, ACETAMINOPHEN AND CAFFEINE 50; 325; 40 MG/1; MG/1; MG/1
1 TABLET ORAL EVERY 6 HOURS PRN
Qty: 8 TABLET | Refills: 0 | Status: SHIPPED | OUTPATIENT
Start: 2017-08-14 | End: 2017-09-13

## 2017-08-14 RX ORDER — FOLIC ACID 1 MG/1
1 TABLET ORAL DAILY
COMMUNITY
End: 2017-08-14 | Stop reason: SDUPTHER

## 2017-08-14 RX ORDER — BUTALBITAL, ACETAMINOPHEN AND CAFFEINE 50; 325; 40 MG/1; MG/1; MG/1
1 TABLET ORAL
Status: COMPLETED | OUTPATIENT
Start: 2017-08-14 | End: 2017-08-14

## 2017-08-14 RX ORDER — NITROFURANTOIN 25; 75 MG/1; MG/1
100 CAPSULE ORAL 2 TIMES DAILY
Qty: 10 CAPSULE | Refills: 0 | Status: SHIPPED | OUTPATIENT
Start: 2017-08-14 | End: 2017-08-19

## 2017-08-14 RX ORDER — NITROFURANTOIN 25; 75 MG/1; MG/1
100 CAPSULE ORAL
Status: COMPLETED | OUTPATIENT
Start: 2017-08-14 | End: 2017-08-14

## 2017-08-14 RX ORDER — PROCHLORPERAZINE EDISYLATE 5 MG/ML
10 INJECTION INTRAMUSCULAR; INTRAVENOUS
Status: COMPLETED | OUTPATIENT
Start: 2017-08-14 | End: 2017-08-14

## 2017-08-14 RX ORDER — HYDROCODONE BITARTRATE AND ACETAMINOPHEN 5; 325 MG/1; MG/1
1 TABLET ORAL
Status: COMPLETED | OUTPATIENT
Start: 2017-08-14 | End: 2017-08-14

## 2017-08-14 RX ADMIN — BUTALBITAL, ACETAMINOPHEN AND CAFFEINE 1 TABLET: 50; 325; 40 TABLET ORAL at 09:08

## 2017-08-14 RX ADMIN — BUTALBITAL, ACETAMINOPHEN AND CAFFEINE 1 TABLET: 50; 325; 40 TABLET ORAL at 12:08

## 2017-08-14 RX ADMIN — DIPHENHYDRAMINE HYDROCHLORIDE 25 MG: 50 INJECTION, SOLUTION INTRAMUSCULAR; INTRAVENOUS at 12:08

## 2017-08-14 RX ADMIN — NITROFURANTOIN MONOHYDRATE AND NITROFURANTOIN MACROCRYSTALLINE 100 MG: 75; 25 CAPSULE ORAL at 03:08

## 2017-08-14 RX ADMIN — HYDROCODONE BITARTRATE AND ACETAMINOPHEN 1 TABLET: 5; 325 TABLET ORAL at 10:08

## 2017-08-14 RX ADMIN — SODIUM CHLORIDE 1000 ML: 0.9 INJECTION, SOLUTION INTRAVENOUS at 09:08

## 2017-08-14 RX ADMIN — PROCHLORPERAZINE EDISYLATE 10 MG: 5 INJECTION INTRAMUSCULAR; INTRAVENOUS at 12:08

## 2017-08-14 NOTE — ED NOTES
"In bed, calm, in no apparent distress. Patient describes pain as "manageable". Call light in reach.  "

## 2017-08-14 NOTE — ED NOTES
In bed,having a conversation with her mother. States 9:10 headache persists. Call light in reach, family at the bedside.

## 2017-08-14 NOTE — ED NOTES
Family at bedside. Patient sitting on the bed, rocking back and forth, holding head. Patient also able to move to retrieve belongings and speak with family without notable distress. Patient reports 9:10 pain. Call light at reach. Dr. Hughes notified.

## 2017-08-14 NOTE — ED TRIAGE NOTES
Woke up this morning with a 10:10 headache. Pt. Reports vomiting and noted blood in the toilet and on tissue when she blew her nose after vomiting.    Gravid abdomen noted, no tenderness on palpation. Patient able to stand without assistance.    Rails up, call bell in reach, family at the bedside.

## 2017-08-14 NOTE — ED NOTES
Family at bedside. Patient tolerated fetal heart tone assessment well and took medicine without difficulty.

## 2017-08-14 NOTE — ED PROVIDER NOTES
"Encounter Date: 8/14/2017    SCRIBE #1 NOTE: I, Erika Parsons, am scribing for, and in the presence of,  Dr. Becerra. I have scribed the entire note.       History     Chief Complaint   Patient presents with    Vomiting     Patient is 17 weeks pregnant. Patient c/o nausea and vomiting with a frontal headache since waking up this morning.  Patient stated when she vomiting there were bright red streaks of blood present.  Patient also c/o dizziness and fatigue.  Denies abd pain and chest pain.      Time seen by provider: 8:26 AM    This is a 32 y.o. female in 17 weeks of gestation, who presents with complaint of vomiting and HA. Pt report waking up this morning with a frontal HA. She describes the pain as "WHOL". Pt rates her pain a 10/10. She notes seeing bright red streaks of blood in the vomit. Pt endorses associated nausea and photophobia. She denies any vaginal bleeding, vaginal pain, dysuria, fever, chills, diaphoresis, Abd pain, or pelvic pain.       The history is provided by the patient and medical records.     Review of patient's allergies indicates:   Allergen Reactions    Keflex [cephalexin] Swelling and Rash    Percocet [oxycodone-acetaminophen] Other (See Comments)     nausea, abdominal cramping - "just doesn't like taking it" - patient took morning of 2/2/2016       Past Medical History:   Diagnosis Date    Anxiety     Asthma     Depression     History of diverticulitis     IBS (irritable bowel syndrome)      Past Surgical History:   Procedure Laterality Date    CERVIX SURGERY      removal of tumor    OVARIAN CYST REMOVAL       Family History   Problem Relation Age of Onset    Diabetes Paternal Grandfather     Hypertension Paternal Grandfather     Diabetes Paternal Grandmother     Hypertension Paternal Grandmother     Diabetes Maternal Grandmother     Hypertension Maternal Grandmother     Diabetes Maternal Grandfather     Hypertension Maternal Grandfather     Diabetes Father     " Hypertension Father     Diabetes Mother     Hypertension Mother     Ovarian cancer Mother      Had hysterectomy at age 37    Diabetes Paternal Aunt     Diabetes Paternal Uncle     Breast cancer Neg Hx     Colon cancer Neg Hx      Social History   Substance Use Topics    Smoking status: Current Every Day Smoker     Packs/day: 0.50     Years: 14.00     Types: Cigarettes    Smokeless tobacco: Never Used    Alcohol use No     Review of Systems   Constitutional: Negative for chills and fever.   HENT: Negative for facial swelling.    Eyes: Positive for photophobia. Negative for pain.   Respiratory: Negative for shortness of breath.    Cardiovascular: Negative for chest pain.   Gastrointestinal: Positive for nausea and vomiting. Negative for abdominal pain.   Endocrine: Negative for polyuria.   Genitourinary: Negative for dysuria.   Musculoskeletal: Negative for back pain, neck pain and neck stiffness.   Skin: Negative for rash.   Neurological: Positive for headaches. Negative for tremors, syncope and weakness.   Psychiatric/Behavioral: Negative for sleep disturbance and suicidal ideas.   All other systems reviewed and are negative.      Physical Exam     Initial Vitals [08/14/17 0741]   BP Pulse Resp Temp SpO2   (!) 112/55 90 14 -- 96 %      MAP       74         Physical Exam    Nursing note and vitals reviewed.  Constitutional: She appears well-developed and well-nourished. She is not diaphoretic. No distress.   HENT:   Head: Normocephalic and atraumatic.   Eyes: Conjunctivae and EOM are normal.   Neck: Normal range of motion. Neck supple.   Cardiovascular: Normal rate, regular rhythm and normal heart sounds. Exam reveals no friction rub.    No murmur heard.  Pulmonary/Chest: Breath sounds normal. No respiratory distress. She has no wheezes. She has no rhonchi. She has no rales. She exhibits no tenderness.   Abdominal: Soft. Bowel sounds are normal. She exhibits no distension and no mass. There is no  tenderness. There is no rebound and no guarding.   Musculoskeletal: Normal range of motion.   Neurological: She is alert and oriented to person, place, and time. No cranial nerve deficit or sensory deficit.   Skin: Skin is warm.   Psychiatric: She has a normal mood and affect. Her behavior is normal.         ED Course   Procedures  Labs Reviewed   CBC W/ AUTO DIFFERENTIAL - Abnormal; Notable for the following:        Result Value    WBC 15.79 (*)     RBC 3.87 (*)     Hemoglobin 11.9 (*)     Hematocrit 35.8 (*)     Gran # 13.5 (*)     Gran% 85.5 (*)     Lymph% 8.1 (*)     All other components within normal limits   COMPREHENSIVE METABOLIC PANEL - Abnormal; Notable for the following:     CO2 19 (*)     Albumin 3.0 (*)     All other components within normal limits   URINALYSIS - Abnormal; Notable for the following:     Appearance, UA Cloudy (*)     Protein, UA Trace (*)     Occult Blood UA 1+ (*)     Nitrite, UA Positive (*)     Leukocytes, UA 1+ (*)     All other components within normal limits   URINALYSIS MICROSCOPIC - Abnormal; Notable for the following:     WBC, UA 10 (*)     Bacteria, UA Many (*)     Non-Squam Epith 3 (*)     All other components within normal limits   POCT URINE PREGNANCY - Abnormal; Notable for the following:     POC Preg Test, Ur Positive (*)     All other components within normal limits   CULTURE, URINE   CULTURE, URINE   POCT GLUCOSE             Medical Decision Makin:44 Spoke with OB/GYN, said it was okay to give the Pt Fioricet for her HA.    12:39 Spoke with OB/GYN. Agrees with IV Fioricet 40 mg and 10 mg of Compazine.   14:36 Pt states that her pain has nearly resolved and she is ready to go home.               Scribe Attestation:   Scribe #1: I performed the above scribed service and the documentation accurately describes the services I performed. I attest to the accuracy of the note.    Attending Attestation:           Physician Attestation for Scribe:  Physician Attestation  Statement for Scribe #1: I, Dr. Becerra , reviewed documentation, as scribed by Erika Parsons in my presence, and it is both accurate and complete.         Attending ED Notes:   Emergent evaluation of 32-year-old female, 17 weeks gestation with prenatal care presents to the emergency department with complaint of nausea and vomiting with pregnancy and headache.  Patient is afebrile, nontoxic-appearing with stable vital signs.  Patient is neurovascularly intact without focal neurologic deficits.PERRLA, EOMI.  Strength 5 of 5 throughout.  Two point discrimination is intact throughout.  Sensation intact to light touch throughout.  Reflexes 2+ throughout.  Good finger to nose task ability. Negative pronator drift.  No papilledema.  No meningeal signs.  Patient states that her blood pressure typically runs low.  Patient found have a white blood cell count 15,000.  H&H is 11.9 and 35.8.  No acute findings on CMP except for CO2 of 19.  Urinary analysis reveals urinary tract infection.  Initial concern for possible subarachnoid hemorrhage.  No acute findings on CT of the head. CT performed within 6 hours of patient's onset of headache.  On reevaluation after treatment in emergency department, patient's headache completely resolved.  Patient states she feels much better and is requesting to go home.  Patient tolerating by mouth intake without complication.  The patient is extensively counseled on her diagnosis and treatment including all diagnostic, laboratory and physical exam findings.  The patient discharged good condition and directed to follow-up with OB and her primary care physician in the next 24-48 hours.            ED Course     Clinical Impression:     1. Migraine without aura and without status migrainosus, not intractable    2. Acute cystitis with hematuria    3. Proteinuria, unspecified type    4. Anemia, unspecified                               Ky Becerra MD  08/15/17 2024

## 2017-08-16 LAB — BACTERIA UR CULT: NORMAL

## 2017-08-31 ENCOUNTER — OFFICE VISIT (OUTPATIENT)
Dept: MATERNAL FETAL MEDICINE | Facility: CLINIC | Age: 32
End: 2017-08-31
Attending: OBSTETRICS & GYNECOLOGY
Payer: MEDICAID

## 2017-08-31 DIAGNOSIS — O44.20 PLACENTA PREVIA, MARGINAL: ICD-10-CM

## 2017-08-31 DIAGNOSIS — Z36.89 ENCOUNTER FOR FETAL ANATOMIC SURVEY: ICD-10-CM

## 2017-08-31 DIAGNOSIS — O44.42 LOW LYING PLACENTA NOS OR WITHOUT HEMORRHAGE, SECOND TRIMESTER: ICD-10-CM

## 2017-08-31 DIAGNOSIS — Z34.02 ENCOUNTER FOR SUPERVISION OF NORMAL FIRST PREGNANCY IN SECOND TRIMESTER: ICD-10-CM

## 2017-08-31 PROCEDURE — 76805 OB US >/= 14 WKS SNGL FETUS: CPT | Mod: PBBFAC | Performed by: OBSTETRICS & GYNECOLOGY

## 2017-08-31 PROCEDURE — 76817 TRANSVAGINAL US OBSTETRIC: CPT | Mod: 26,S$PBB,, | Performed by: OBSTETRICS & GYNECOLOGY

## 2017-08-31 PROCEDURE — 76805 OB US >/= 14 WKS SNGL FETUS: CPT | Mod: 26,S$PBB,, | Performed by: OBSTETRICS & GYNECOLOGY

## 2017-08-31 PROCEDURE — 76817 TRANSVAGINAL US OBSTETRIC: CPT | Mod: PBBFAC | Performed by: OBSTETRICS & GYNECOLOGY

## 2017-08-31 PROCEDURE — 3008F BODY MASS INDEX DOCD: CPT | Mod: ,,, | Performed by: OBSTETRICS & GYNECOLOGY

## 2017-08-31 PROCEDURE — 99212 OFFICE O/P EST SF 10 MIN: CPT | Mod: 25,TH,S$PBB, | Performed by: OBSTETRICS & GYNECOLOGY

## 2017-08-31 NOTE — LETTER
August 31, 2017      Mahogany Heard MD  [address]  [address]  [facility] [address]           [facility]  [address]  [facility] [address]  Phone: [phone]          Patient: Alena Willis   MR Number: [id number]   YOB: 1985   Date of Visit: 8/31/2017       Dear Dr. Mahogany Heard:    Thank you for referring Alena Willis to me for evaluation. Attached you will find relevant portions of my assessment and plan of care.    If you have questions, please do not hesitate to call me. I look forward to following Alena Willis along with you.    Sincerely,    Shawn Escudero MD    Enclosure  CC:  No Recipients    If you would like to receive this communication electronically, please contact [email or web address] or [phone] to request more information on [facility] access.    For providers and/or their staff who would like to refer a patient to [facility], please contact us through our one-stop-shop provider referral line, [facility] , at [phone].    If you feel you have received this communication in error or would no longer like to receive these types of communications, please e-mail [email or web address]

## 2017-08-31 NOTE — PROGRESS NOTES
Indication  ========    Fetal anatomy survey.    History  ======    General History  Other: no screenings    Method  ======    Transvaginal ultrasound examination, Transabdominal ultrasound examination. View: Suboptimal view: limited by fetal position.    Pregnancy  =========    Roy pregnancy. Number of fetuses: 1.    Dating  ======    LMP on: 4/18/2017  Cycle: regular cycle  GA by LMP 19 w + 2 d  FADY by LMP: 1/23/2018  Ultrasound examination on: 8/31/2017  GA by U/S based upon: AC, BPD, Femur, HC  GA by U/S 19 w + 6 d  FADY by U/S: 1/19/2018  Assigned: Dating performed on 08/31/2017, based on the LMP  Assigned GA 19 w + 2 d  Assigned FADY: 1/23/2018    General Evaluation  ==============    Cardiac activity: present.  bpm.  Fetal movements: visualized.  Presentation: Unstable lie.  Placenta: anterior, Marginal praevia.  Umbilical cord: normal, 3 vessel cord.  Amniotic fluid: normal amount.    Fetal Biometry  ============    Fetal Biometry  BPD 45.2 mm 19w 5d Hadlock  OFD 61.4 mm 21w 1d Kalpesh  .9 mm 19w 5d Hadlock  .6 mm 20w 3d Hadlock  Femur 30.0 mm 19w 2d Hadlock  Cerebellum tr 22.0 mm 21w 3d Mercado  CM 4.0 mm  Nuchal fold 3.81 mm  Humerus 29.9 mm 19w 6d Kalpesh   g  Calculated by: Hadlock (BPD-HC-AC-FL)  EFW (lb) 0 lb  EFW (oz) 11 oz  Cephalic index 0.74  HC / AC 1.13  FL / BPD 0.66  FL / AC 0.20   bpm  Head / Face / Neck   7.7 mm    Fetal Anatomy  ===========    Cranium: normal  Lateral ventricles: normal  Choroid plexus: normal  Midline falx: normal  Cavum septi pellucidi: normal  Cerebellum: normal  Cisterna magna: normal  Rt lateral ventricle: normal  Lt lateral ventricle: normal  Rt choroid plexus: normal  Lt choroid plexus: normal  Nuchal fold: normal  Lips: normal  Profile: normal  Nose: normal  RVOT: normal  LVOT: normal  4-chamber view: 4-chamber normal, septum suboptimal  Situs: normal  Ductal arch: normal  Cardiac position: normal  Cardiac axis: normal  Cardiac  size: normal  Cardiac rhythm: normal  Rt lung: normal  Lt lung: normal  Diaphragm: normal  Cord insertion: suboptimal  Stomach: normal  Kidneys: normal  Bladder: normal  Genitals: normal  Abdom. wall: appears normal  Abdom. cavity: normal  Rt kidney: normal  Lt kidney: normal  Cervical spine: suboptimal  Thoracic spine: suboptimal  Lumbar spine: suboptimal  Sacral spine: suboptimal  Arms: normal  Legs: normal  Rt arm: normal  Lt arm: normal  Rt hand: normal  Lt hand: open  Rt leg: normal  Lt leg: normal  Rt foot: normal  Lt foot: normal  Gender: female  Wants to know gender: yes    Maternal Structures  ===============    Uterus / Cervix  Uterus: Normal  Ovaries / Tubes / Adnexa  Rt ovary: Not visualized  Lt ovary: Not visualized    Consultation  ==========    Chart reviewed.    A low-lying/marginal placenta previa was identified on ultrasound today. Over 90% of marginal previas identified in the mid-trimester of  pregnancy will resolve by term. Initial follow-up assessment of placental location is recommended between 26 - 32 weeks. If the previa persists  at that time, a second f/u exam at 32-36 weeks may be recommended. If there appears to be a persistent marginal/low-lying previa completely  covering the os, delivery will be planned for 36-37 weeks. Bleeding precautions were reviewed with the patient and she was encouraged to  readdress these issues with her referring OB. A f/u study has been scheduled to reassess placental location at around 26 weeks.    I discussed this finding with the patient and spent approximately 10 min in face-to-face time, > 50% in counseling.    Impression  =========    Some views are suboptimal secondary to fetal positioning; however, no obvious abnormalities are detected. Normal fetal growth; normal  amniotic fluid volume per qualitative assessment. The placental is anterior in location and the inferior edge is between 1.5 and 2 cm from the  internal os on transvaginal scan. Normal  appearing cervical length is noted.    Recommendation  ==============    A follow-up ultrasound is scheduled for 6 weeks to re-evaluate anatomy and placental location.

## 2017-09-12 ENCOUNTER — ROUTINE PRENATAL (OUTPATIENT)
Dept: OBSTETRICS AND GYNECOLOGY | Facility: CLINIC | Age: 32
End: 2017-09-12
Payer: MEDICAID

## 2017-09-12 VITALS — DIASTOLIC BLOOD PRESSURE: 60 MMHG | SYSTOLIC BLOOD PRESSURE: 112 MMHG | BODY MASS INDEX: 29.2 KG/M2 | WEIGHT: 192 LBS

## 2017-09-12 DIAGNOSIS — Z34.02 ENCOUNTER FOR SUPERVISION OF NORMAL FIRST PREGNANCY IN SECOND TRIMESTER: ICD-10-CM

## 2017-09-12 DIAGNOSIS — R30.0 DYSURIA: Primary | ICD-10-CM

## 2017-09-12 DIAGNOSIS — O99.519 MATERNAL ASTHMA COMPLICATING PREGNANCY: ICD-10-CM

## 2017-09-12 DIAGNOSIS — J45.909 MATERNAL ASTHMA COMPLICATING PREGNANCY: ICD-10-CM

## 2017-09-12 PROCEDURE — 99999 PR PBB SHADOW E&M-EST. PATIENT-LVL III: CPT | Mod: PBBFAC,,, | Performed by: STUDENT IN AN ORGANIZED HEALTH CARE EDUCATION/TRAINING PROGRAM

## 2017-09-12 PROCEDURE — 87086 URINE CULTURE/COLONY COUNT: CPT

## 2017-09-12 PROCEDURE — 99213 OFFICE O/P EST LOW 20 MIN: CPT | Mod: PBBFAC,PO | Performed by: STUDENT IN AN ORGANIZED HEALTH CARE EDUCATION/TRAINING PROGRAM

## 2017-09-12 PROCEDURE — 99213 OFFICE O/P EST LOW 20 MIN: CPT | Mod: TH,S$PBB,, | Performed by: STUDENT IN AN ORGANIZED HEALTH CARE EDUCATION/TRAINING PROGRAM

## 2017-09-12 PROCEDURE — 3008F BODY MASS INDEX DOCD: CPT | Mod: ,,, | Performed by: STUDENT IN AN ORGANIZED HEALTH CARE EDUCATION/TRAINING PROGRAM

## 2017-09-12 PROCEDURE — 87186 SC STD MICRODIL/AGAR DIL: CPT

## 2017-09-12 PROCEDURE — 87088 URINE BACTERIA CULTURE: CPT

## 2017-09-12 PROCEDURE — 87077 CULTURE AEROBIC IDENTIFY: CPT

## 2017-09-12 NOTE — PROGRESS NOTES
Complaints today: Pt having nausea, vomiting most days. Has gained 40 pounds this pregnancy. Was taking B6 regularly and has not been as consistent with the regimen lately. No vaginal bleeding. Is reporting dysuria. Had an ultrasound which showed marginal placenta previa.    /60   Wt 87.1 kg (192 lb 0.3 oz)   LMP 2017   BMI 29.20 kg/m²     32 y.o., at 21w0d by Estimated Date of Delivery: 18  Patient Active Problem List   Diagnosis    Anxiety and depression    Pregnancy, supervision, normal, first    Maternal asthma complicating pregnancy    Mental disorder affecting pregnancy     OB History    Para Term  AB Living   3 1     1     SAB TAB Ectopic Multiple Live Births   1              # Outcome Date GA Lbr Darrell/2nd Weight Sex Delivery Anes PTL Lv   3 Current            2 SAB            1 Para                   Dating reviewed    Allergies and problem list reviewed and updated    Medical and surgical history reviewed    Prenatal labs reviewed and updated    Physical Exam:  ABD: soft, gravid, nontender    Assessment/Plan:  31 yo  at 21 and 0  --nitrites in urine dip. Has had + urine cx in first trimester. Will order urine cx today.  --explained ultrasound results of marginal placenta previa and necessity of repeating scan at later date  --advise taking B6 on a regular basis, will hold off on prescribing antiemetics  --f/u in 4 weeks, will draw prenatal labs at that time

## 2017-09-13 ENCOUNTER — TELEPHONE (OUTPATIENT)
Dept: OBSTETRICS AND GYNECOLOGY | Facility: CLINIC | Age: 32
End: 2017-09-13

## 2017-09-13 ENCOUNTER — LAB VISIT (OUTPATIENT)
Dept: LAB | Facility: OTHER | Age: 32
End: 2017-09-13
Attending: OBSTETRICS & GYNECOLOGY
Payer: MEDICAID

## 2017-09-13 DIAGNOSIS — Z34.02 ENCOUNTER FOR SUPERVISION OF NORMAL FIRST PREGNANCY IN SECOND TRIMESTER: ICD-10-CM

## 2017-09-13 LAB
BLD GP AB SCN CELLS X3 SERPL QL: NORMAL
ERYTHROCYTE [DISTWIDTH] IN BLOOD BY AUTOMATED COUNT: 13.3 %
HCT VFR BLD AUTO: 37.8 %
HGB BLD-MCNC: 12.4 G/DL
HIV 1+2 AB+HIV1 P24 AG SERPL QL IA: NEGATIVE
MCH RBC QN AUTO: 30.8 PG
MCHC RBC AUTO-ENTMCNC: 32.8 G/DL
MCV RBC AUTO: 94 FL
PLATELET # BLD AUTO: 222 K/UL
PMV BLD AUTO: 12.1 FL
RBC # BLD AUTO: 4.02 M/UL
RPR SER QL: NORMAL
WBC # BLD AUTO: 11.57 K/UL

## 2017-09-13 PROCEDURE — 85027 COMPLETE CBC AUTOMATED: CPT

## 2017-09-13 PROCEDURE — 86850 RBC ANTIBODY SCREEN: CPT

## 2017-09-13 PROCEDURE — 81511 FTL CGEN ABNOR FOUR ANAL: CPT

## 2017-09-13 PROCEDURE — 86592 SYPHILIS TEST NON-TREP QUAL: CPT

## 2017-09-13 PROCEDURE — 86703 HIV-1/HIV-2 1 RESULT ANTBDY: CPT

## 2017-09-13 PROCEDURE — 86762 RUBELLA ANTIBODY: CPT

## 2017-09-13 NOTE — TELEPHONE ENCOUNTER
----- Message from Andre Enamorado sent at 9/13/2017  3:31 PM CDT -----  19wks ob pt stated that some one was supposed order something for uti. Pt can be reached at [phone].

## 2017-09-14 ENCOUNTER — PATIENT MESSAGE (OUTPATIENT)
Dept: OBSTETRICS AND GYNECOLOGY | Facility: CLINIC | Age: 32
End: 2017-09-14

## 2017-09-14 LAB
RUBV IGG SER-ACNC: 91 IU/ML
RUBV IGG SER-IMP: REACTIVE

## 2017-09-14 RX ORDER — NITROFURANTOIN 25; 75 MG/1; MG/1
100 CAPSULE ORAL 2 TIMES DAILY
Qty: 14 CAPSULE | Refills: 0 | Status: SHIPPED | OUTPATIENT
Start: 2017-09-14 | End: 2017-09-21

## 2017-09-15 LAB
# FETUSES US: NORMAL
2ND TRIMESTER 4 SCREEN PNL SERPL: NEGATIVE
2ND TRIMESTER 4 SCREEN SERPL-IMP: NORMAL
AFP MOM SERPL: 0.84
AFP SERPL-MCNC: 49.4 NG/ML
AGE AT DELIVERY: 32
B-HCG MOM SERPL: 0.55
B-HCG SERPL-ACNC: 6.8 IU/ML
BACTERIA UR CULT: NORMAL
FET TS 21 RISK FROM MAT AGE: NORMAL
GA (DAYS): 1 D
GA (WEEKS): 21 WK
GA METHOD: NORMAL
IDDM PATIENT QL: NORMAL
INHIBIN A MOM SERPL: 0.7
INHIBIN A SERPL-MCNC: 248.4 PG/ML
SMOKING STATUS FTND: YES
TS 18 RISK FETUS: NORMAL
TS 21 RISK FETUS: NORMAL
U ESTRIOL MOM SERPL: 0.68
U ESTRIOL SERPL-MCNC: 1.61 NG/ML

## 2017-10-13 ENCOUNTER — OFFICE VISIT (OUTPATIENT)
Dept: MATERNAL FETAL MEDICINE | Facility: CLINIC | Age: 32
End: 2017-10-13
Attending: OBSTETRICS & GYNECOLOGY
Payer: MEDICAID

## 2017-10-13 DIAGNOSIS — Z36.89 ULTRASOUND SCAN TO EVALUATE PLACENTA LOCATION: ICD-10-CM

## 2017-10-13 PROCEDURE — 76817 TRANSVAGINAL US OBSTETRIC: CPT | Mod: PBBFAC | Performed by: OBSTETRICS & GYNECOLOGY

## 2017-10-13 PROCEDURE — 76816 OB US FOLLOW-UP PER FETUS: CPT | Mod: PBBFAC | Performed by: OBSTETRICS & GYNECOLOGY

## 2017-10-13 PROCEDURE — 99499 UNLISTED E&M SERVICE: CPT | Mod: S$PBB,,, | Performed by: OBSTETRICS & GYNECOLOGY

## 2017-10-13 PROCEDURE — 76816 OB US FOLLOW-UP PER FETUS: CPT | Mod: 26,S$PBB,, | Performed by: OBSTETRICS & GYNECOLOGY

## 2017-10-13 PROCEDURE — 76817 TRANSVAGINAL US OBSTETRIC: CPT | Mod: 26,S$PBB,, | Performed by: OBSTETRICS & GYNECOLOGY

## 2017-10-13 NOTE — PROGRESS NOTES
Indication  ========    Follow-up evaluation of anatomy. Evaluation of fetal growth.    History  ======    General History  Other: no screenings    Method  ======    Transabdominal ultrasound examination.    Pregnancy  =========    Roy pregnancy. Number of fetuses: 1.    Dating  ======    LMP on: 4/18/2017  Cycle: regular cycle  GA by LMP 25 w + 3 d  FADY by LMP: 1/23/2018  Ultrasound examination on: 10/13/2017  GA by U/S based upon: AC, BPD, Femur, HC  GA by U/S 26 w + 3 d  FADY by U/S: 1/16/2018  Assigned: Dating performed on 08/31/2017, based on the LMP  Assigned GA 25 w + 3 d  Assigned FADY: 1/23/2018    General Evaluation  ==============    Cardiac activity: present.  bpm.  Fetal movements: visualized.  Presentation: breech.  Placenta: anterior.  Umbilical cord: 3 vessel cord.  Amniotic fluid: MVP 4.2 cm.    Fetal Biometry  ============    Fetal Biometry  BPD 65.6 mm 26w 3d Hadlock  OFD 85.7 mm 27w 5d Kalpesh  .1 mm 26w 2d Hadlock  .5 mm 27w 1d Hadlock  Femur 48.0 mm 26w 1d Hadlock   g 66% Sea  Calculated by: Hadlock (BPD-HC-AC-FL)  EFW (lb) 2 lb  EFW (oz) 2 oz  Cephalic index 0.77  HC / AC 1.06  FL / BPD 0.73  FL / AC 0.21  MVP 4.2 cm   bpm  Head / Face / Neck   5.0 mm    Fetal Anatomy  ===========    Cranium: normal  Posterior fossa: normal  4-chamber view: 4-chamber normal, septum normal  Cord insertion: normal  Stomach: normal  Kidneys: normal  Bladder: normal  Cervical spine: normal  Thoracic spine: normal  Lumbar spine: normal  Sacral spine: normal  Gender: female  Wants to know gender: yes  Other: A full anatomy survey previously performed.    Impression  =========    Roy live intrauterine pregnancy.  Overall normal fetal growth.  Amniotic fluid volume is normal.  The previously suboptimally visualized fetal structures were seen today and appeared normal. Limited anatomy was normal.  On transvaginal ultrasound, the transvaginal cervical length appears  normal. Placenta is anterior and there is no evidence of previa or vasa  previa.    Recommendation  ==============    Follow up ultrasound as clinically indicated.

## 2017-10-19 ENCOUNTER — ROUTINE PRENATAL (OUTPATIENT)
Dept: OBSTETRICS AND GYNECOLOGY | Facility: CLINIC | Age: 32
End: 2017-10-19
Payer: MEDICAID

## 2017-10-19 VITALS
WEIGHT: 201.06 LBS | BODY MASS INDEX: 30.57 KG/M2 | DIASTOLIC BLOOD PRESSURE: 60 MMHG | SYSTOLIC BLOOD PRESSURE: 120 MMHG

## 2017-10-19 DIAGNOSIS — J45.909 MATERNAL ASTHMA COMPLICATING PREGNANCY: ICD-10-CM

## 2017-10-19 DIAGNOSIS — O99.519 MATERNAL ASTHMA COMPLICATING PREGNANCY: ICD-10-CM

## 2017-10-19 DIAGNOSIS — Z34.02 ENCOUNTER FOR SUPERVISION OF NORMAL FIRST PREGNANCY IN SECOND TRIMESTER: Primary | ICD-10-CM

## 2017-10-19 PROCEDURE — 87086 URINE CULTURE/COLONY COUNT: CPT

## 2017-10-19 PROCEDURE — 90471 IMMUNIZATION ADMIN: CPT | Mod: PBBFAC,PO

## 2017-10-19 PROCEDURE — 99999 PR PBB SHADOW E&M-EST. PATIENT-LVL III: CPT | Mod: PBBFAC,,, | Performed by: STUDENT IN AN ORGANIZED HEALTH CARE EDUCATION/TRAINING PROGRAM

## 2017-10-19 PROCEDURE — 99213 OFFICE O/P EST LOW 20 MIN: CPT | Mod: PBBFAC,PO | Performed by: STUDENT IN AN ORGANIZED HEALTH CARE EDUCATION/TRAINING PROGRAM

## 2017-10-19 PROCEDURE — 99213 OFFICE O/P EST LOW 20 MIN: CPT | Mod: TH,S$PBB,, | Performed by: STUDENT IN AN ORGANIZED HEALTH CARE EDUCATION/TRAINING PROGRAM

## 2017-10-19 PROCEDURE — 87077 CULTURE AEROBIC IDENTIFY: CPT

## 2017-10-19 PROCEDURE — 87088 URINE BACTERIA CULTURE: CPT

## 2017-10-19 PROCEDURE — 87186 SC STD MICRODIL/AGAR DIL: CPT

## 2017-10-19 RX ORDER — CETIRIZINE HYDROCHLORIDE 5 MG/1
5 TABLET ORAL DAILY
Qty: 30 TABLET | Refills: 12 | Status: SHIPPED | OUTPATIENT
Start: 2017-10-19 | End: 2018-10-19

## 2017-10-19 NOTE — PROGRESS NOTES
Complaints today: n/v/gerd  Good fm.  Denies ctx, vb, lof.    /60   Wt 91.2 kg (201 lb 1 oz)   LMP 2017   BMI 30.57 kg/m²     32 y.o., at 26w2d by Estimated Date of Delivery: 18  Patient Active Problem List   Diagnosis    Anxiety and depression    Pregnancy, supervision, normal, first    Maternal asthma complicating pregnancy    Mental disorder affecting pregnancy     OB History    Para Term  AB Living   3 1     1     SAB TAB Ectopic Multiple Live Births   1              # Outcome Date GA Lbr Darrell/2nd Weight Sex Delivery Anes PTL Lv   3 Current            2 SAB            1 Para                   Dating reviewed    Allergies and problem list reviewed and updated    Medical and surgical history reviewed    Prenatal labs reviewed and updated    Physical Exam:  ABD: soft, gravid, nontender,     Assessment:  Alena MANNING was seen today for routine prenatal visit, urinary frequency, rash, flank pain and allergies.    Diagnoses and all orders for this visit:    Encounter for supervision of normal first pregnancy in second trimester  -     CBC auto differential; Future  -     OB Glucose Screen; Future  -     Urine culture    Maternal asthma complicating pregnancy    Other orders  -     cetirizine (ZYRTEC) 5 MG tablet; Take 1 tablet (5 mg total) by mouth once daily.  -     ranitidine (ZANTAC) 150 MG tablet; Take 1 tablet (150 mg total) by mouth 2 (two) times daily.         Plan:   follow up labs.  Flu shot   follow up 4 Weeks, kick counts, labor precautions

## 2017-10-23 ENCOUNTER — LAB VISIT (OUTPATIENT)
Dept: LAB | Facility: OTHER | Age: 32
End: 2017-10-23
Attending: OBSTETRICS & GYNECOLOGY
Payer: MEDICAID

## 2017-10-23 ENCOUNTER — PATIENT MESSAGE (OUTPATIENT)
Dept: OBSTETRICS AND GYNECOLOGY | Facility: CLINIC | Age: 32
End: 2017-10-23

## 2017-10-23 DIAGNOSIS — O99.810 ABNORMAL GLUCOSE AFFECTING PREGNANCY: Primary | ICD-10-CM

## 2017-10-23 DIAGNOSIS — Z34.02 ENCOUNTER FOR SUPERVISION OF NORMAL FIRST PREGNANCY IN SECOND TRIMESTER: ICD-10-CM

## 2017-10-23 LAB
BACTERIA UR CULT: NORMAL
BASOPHILS # BLD AUTO: 0.01 K/UL
BASOPHILS NFR BLD: 0.1 %
DIFFERENTIAL METHOD: ABNORMAL
EOSINOPHIL # BLD AUTO: 0.1 K/UL
EOSINOPHIL NFR BLD: 0.6 %
ERYTHROCYTE [DISTWIDTH] IN BLOOD BY AUTOMATED COUNT: 13.6 %
GLUCOSE SERPL-MCNC: 144 MG/DL
HCT VFR BLD AUTO: 33.1 %
HGB BLD-MCNC: 10.7 G/DL
LYMPHOCYTES # BLD AUTO: 1.1 K/UL
LYMPHOCYTES NFR BLD: 8.7 %
MCH RBC QN AUTO: 30.1 PG
MCHC RBC AUTO-ENTMCNC: 32.3 G/DL
MCV RBC AUTO: 93 FL
MONOCYTES # BLD AUTO: 0.6 K/UL
MONOCYTES NFR BLD: 5.1 %
NEUTROPHILS # BLD AUTO: 10.5 K/UL
NEUTROPHILS NFR BLD: 85.2 %
PLATELET # BLD AUTO: 252 K/UL
PMV BLD AUTO: 11.4 FL
RBC # BLD AUTO: 3.56 M/UL
WBC # BLD AUTO: 12.35 K/UL

## 2017-10-23 PROCEDURE — 85025 COMPLETE CBC W/AUTO DIFF WBC: CPT

## 2017-10-23 PROCEDURE — 36415 COLL VENOUS BLD VENIPUNCTURE: CPT

## 2017-10-23 PROCEDURE — 82950 GLUCOSE TEST: CPT

## 2017-10-23 RX ORDER — NITROFURANTOIN 25; 75 MG/1; MG/1
100 CAPSULE ORAL 2 TIMES DAILY
Qty: 14 CAPSULE | Refills: 0 | Status: SHIPPED | OUTPATIENT
Start: 2017-10-23 | End: 2017-10-30

## 2017-10-27 ENCOUNTER — TELEPHONE (OUTPATIENT)
Dept: OBSTETRICS AND GYNECOLOGY | Facility: CLINIC | Age: 32
End: 2017-10-27

## 2017-11-13 ENCOUNTER — HOSPITAL ENCOUNTER (OUTPATIENT)
Facility: OTHER | Age: 32
Discharge: HOME | End: 2017-11-15
Attending: OBSTETRICS & GYNECOLOGY | Admitting: OBSTETRICS & GYNECOLOGY
Payer: MEDICAID

## 2017-11-13 DIAGNOSIS — Z3A.29 29 WEEKS GESTATION OF PREGNANCY: ICD-10-CM

## 2017-11-13 DIAGNOSIS — F41.9 ANXIETY AND DEPRESSION: ICD-10-CM

## 2017-11-13 DIAGNOSIS — O99.519 MATERNAL ASTHMA COMPLICATING PREGNANCY: ICD-10-CM

## 2017-11-13 DIAGNOSIS — J45.909 MATERNAL ASTHMA COMPLICATING PREGNANCY: ICD-10-CM

## 2017-11-13 DIAGNOSIS — N12 PYELONEPHRITIS: Primary | ICD-10-CM

## 2017-11-13 DIAGNOSIS — F32.A ANXIETY AND DEPRESSION: ICD-10-CM

## 2017-11-13 PROBLEM — R73.09 GLUCOSE TOLERANCE TEST ABNORMAL: Status: ACTIVE | Noted: 2017-11-13

## 2017-11-13 LAB
ABO + RH BLD: NORMAL
ANION GAP SERPL CALC-SCNC: 9 MMOL/L
BACTERIA #/AREA URNS HPF: ABNORMAL /HPF
BASOPHILS # BLD AUTO: 0.01 K/UL
BASOPHILS NFR BLD: 0.1 %
BILIRUB SERPL-MCNC: NEGATIVE MG/DL
BILIRUB UR QL STRIP: NEGATIVE
BLD GP AB SCN CELLS X3 SERPL QL: NORMAL
BLOOD URINE, POC: NEGATIVE
BUN SERPL-MCNC: 6 MG/DL
CALCIUM SERPL-MCNC: 9.1 MG/DL
CHLORIDE SERPL-SCNC: 106 MMOL/L
CLARITY UR: ABNORMAL
CO2 SERPL-SCNC: 22 MMOL/L
COLOR UR: YELLOW
COLOR, POC UA: NORMAL
CREAT SERPL-MCNC: 0.5 MG/DL
DIFFERENTIAL METHOD: ABNORMAL
EOSINOPHIL # BLD AUTO: 0.1 K/UL
EOSINOPHIL NFR BLD: 0.9 %
ERYTHROCYTE [DISTWIDTH] IN BLOOD BY AUTOMATED COUNT: 13.9 %
EST. GFR  (AFRICAN AMERICAN): >60 ML/MIN/1.73 M^2
EST. GFR  (NON AFRICAN AMERICAN): >60 ML/MIN/1.73 M^2
GLUCOSE SERPL-MCNC: 80 MG/DL
GLUCOSE UR QL STRIP: NEGATIVE
GLUCOSE UR QL STRIP: NORMAL
HCT VFR BLD AUTO: 32.8 %
HGB BLD-MCNC: 10.9 G/DL
HGB UR QL STRIP: NEGATIVE
KETONES UR QL STRIP: NEGATIVE
KETONES UR QL STRIP: NEGATIVE
LEUKOCYTE ESTERASE UR QL STRIP: ABNORMAL
LEUKOCYTE ESTERASE URINE, POC: NORMAL
LYMPHOCYTES # BLD AUTO: 1.5 K/UL
LYMPHOCYTES NFR BLD: 12.7 %
MCH RBC QN AUTO: 30.4 PG
MCHC RBC AUTO-ENTMCNC: 33.2 G/DL
MCV RBC AUTO: 92 FL
MICROSCOPIC COMMENT: ABNORMAL
MONOCYTES # BLD AUTO: 0.7 K/UL
MONOCYTES NFR BLD: 6 %
NEUTROPHILS # BLD AUTO: 9.3 K/UL
NEUTROPHILS NFR BLD: 79.9 %
NITRITE UR QL STRIP: POSITIVE
NITRITE, POC UA: NORMAL
PH UR STRIP: 7 [PH] (ref 5–8)
PH, POC UA: NORMAL
PLATELET # BLD AUTO: 267 K/UL
PMV BLD AUTO: 10.9 FL
POTASSIUM SERPL-SCNC: 3.5 MMOL/L
PROT UR QL STRIP: NEGATIVE
PROTEIN, POC: NEGATIVE
RBC # BLD AUTO: 3.58 M/UL
SODIUM SERPL-SCNC: 137 MMOL/L
SP GR UR STRIP: <=1.005 (ref 1–1.03)
SPECIFIC GRAVITY, POC UA: NORMAL
SQUAMOUS #/AREA URNS HPF: 6 /HPF
URN SPEC COLLECT METH UR: ABNORMAL
UROBILINOGEN UR STRIP-ACNC: NEGATIVE EU/DL
UROBILINOGEN, POC UA: NORMAL
WBC # BLD AUTO: 11.66 K/UL
WBC #/AREA URNS HPF: 10 /HPF (ref 0–5)

## 2017-11-13 PROCEDURE — 25000003 PHARM REV CODE 250: Performed by: OBSTETRICS & GYNECOLOGY

## 2017-11-13 PROCEDURE — 59025 FETAL NON-STRESS TEST: CPT | Mod: 26,,, | Performed by: OBSTETRICS & GYNECOLOGY

## 2017-11-13 PROCEDURE — 81002 URINALYSIS NONAUTO W/O SCOPE: CPT

## 2017-11-13 PROCEDURE — 63600175 PHARM REV CODE 636 W HCPCS: Performed by: OBSTETRICS & GYNECOLOGY

## 2017-11-13 PROCEDURE — G0378 HOSPITAL OBSERVATION PER HR: HCPCS

## 2017-11-13 PROCEDURE — 87086 URINE CULTURE/COLONY COUNT: CPT

## 2017-11-13 PROCEDURE — 99283 EMERGENCY DEPT VISIT LOW MDM: CPT | Mod: 25,,, | Performed by: OBSTETRICS & GYNECOLOGY

## 2017-11-13 PROCEDURE — 81000 URINALYSIS NONAUTO W/SCOPE: CPT

## 2017-11-13 PROCEDURE — 99285 EMERGENCY DEPT VISIT HI MDM: CPT | Mod: 25

## 2017-11-13 PROCEDURE — 87186 SC STD MICRODIL/AGAR DIL: CPT

## 2017-11-13 PROCEDURE — 86901 BLOOD TYPING SEROLOGIC RH(D): CPT

## 2017-11-13 PROCEDURE — 85025 COMPLETE CBC W/AUTO DIFF WBC: CPT

## 2017-11-13 PROCEDURE — 86900 BLOOD TYPING SEROLOGIC ABO: CPT

## 2017-11-13 PROCEDURE — 80048 BASIC METABOLIC PNL TOTAL CA: CPT

## 2017-11-13 PROCEDURE — 87077 CULTURE AEROBIC IDENTIFY: CPT

## 2017-11-13 PROCEDURE — 59025 FETAL NON-STRESS TEST: CPT

## 2017-11-13 PROCEDURE — 87088 URINE BACTERIA CULTURE: CPT

## 2017-11-13 RX ORDER — HYDROCODONE BITARTRATE AND ACETAMINOPHEN 5; 325 MG/1; MG/1
1 TABLET ORAL ONCE
Status: COMPLETED | OUTPATIENT
Start: 2017-11-13 | End: 2017-11-13

## 2017-11-13 RX ORDER — ONDANSETRON 8 MG/1
8 TABLET, ORALLY DISINTEGRATING ORAL EVERY 8 HOURS PRN
Status: DISCONTINUED | OUTPATIENT
Start: 2017-11-13 | End: 2017-11-15 | Stop reason: HOSPADM

## 2017-11-13 RX ORDER — PRENATAL WITH FERROUS FUM AND FOLIC ACID 3080; 920; 120; 400; 22; 1.84; 3; 20; 10; 1; 12; 200; 27; 25; 2 [IU]/1; [IU]/1; MG/1; [IU]/1; MG/1; MG/1; MG/1; MG/1; MG/1; MG/1; UG/1; MG/1; MG/1; MG/1; MG/1
1 TABLET ORAL DAILY
Status: DISCONTINUED | OUTPATIENT
Start: 2017-11-14 | End: 2017-11-15 | Stop reason: HOSPADM

## 2017-11-13 RX ORDER — DIPHENHYDRAMINE HYDROCHLORIDE 50 MG/ML
25 INJECTION INTRAMUSCULAR; INTRAVENOUS EVERY 4 HOURS PRN
Status: DISCONTINUED | OUTPATIENT
Start: 2017-11-13 | End: 2017-11-15 | Stop reason: HOSPADM

## 2017-11-13 RX ORDER — SIMETHICONE 80 MG
1 TABLET,CHEWABLE ORAL EVERY 6 HOURS PRN
Status: DISCONTINUED | OUTPATIENT
Start: 2017-11-13 | End: 2017-11-15 | Stop reason: HOSPADM

## 2017-11-13 RX ORDER — AMOXICILLIN 250 MG
1 CAPSULE ORAL NIGHTLY PRN
Status: DISCONTINUED | OUTPATIENT
Start: 2017-11-13 | End: 2017-11-15 | Stop reason: HOSPADM

## 2017-11-13 RX ORDER — SODIUM CHLORIDE, SODIUM LACTATE, POTASSIUM CHLORIDE, CALCIUM CHLORIDE 600; 310; 30; 20 MG/100ML; MG/100ML; MG/100ML; MG/100ML
INJECTION, SOLUTION INTRAVENOUS CONTINUOUS
Status: DISCONTINUED | OUTPATIENT
Start: 2017-11-13 | End: 2017-11-15 | Stop reason: HOSPADM

## 2017-11-13 RX ORDER — ACETAMINOPHEN 500 MG
1000 TABLET ORAL ONCE
Status: COMPLETED | OUTPATIENT
Start: 2017-11-13 | End: 2017-11-13

## 2017-11-13 RX ORDER — DIPHENHYDRAMINE HCL 25 MG
25 CAPSULE ORAL EVERY 4 HOURS PRN
Status: DISCONTINUED | OUTPATIENT
Start: 2017-11-13 | End: 2017-11-15 | Stop reason: HOSPADM

## 2017-11-13 RX ADMIN — SODIUM CHLORIDE, SODIUM LACTATE, POTASSIUM CHLORIDE, AND CALCIUM CHLORIDE: .6; .31; .03; .02 INJECTION, SOLUTION INTRAVENOUS at 10:11

## 2017-11-13 RX ADMIN — GENTAMICIN SULFATE 372 MG: 40 INJECTION, SOLUTION INTRAMUSCULAR; INTRAVENOUS at 10:11

## 2017-11-13 RX ADMIN — ACETAMINOPHEN 1000 MG: 500 TABLET ORAL at 08:11

## 2017-11-13 RX ADMIN — HYDROCODONE BITARTRATE AND ACETAMINOPHEN 1 TABLET: 5; 325 TABLET ORAL at 09:11

## 2017-11-14 LAB
ALBUMIN SERPL BCP-MCNC: 2.3 G/DL
ALP SERPL-CCNC: 80 U/L
ALT SERPL W/O P-5'-P-CCNC: 7 U/L
AMYLASE SERPL-CCNC: 46 U/L
ANION GAP SERPL CALC-SCNC: 9 MMOL/L
AST SERPL-CCNC: 9 U/L
BILIRUB SERPL-MCNC: 0.1 MG/DL
BUN SERPL-MCNC: 7 MG/DL
CALCIUM SERPL-MCNC: 8.9 MG/DL
CHLORIDE SERPL-SCNC: 108 MMOL/L
CO2 SERPL-SCNC: 20 MMOL/L
CREAT SERPL-MCNC: 0.6 MG/DL
EST. GFR  (AFRICAN AMERICAN): >60 ML/MIN/1.73 M^2
EST. GFR  (NON AFRICAN AMERICAN): >60 ML/MIN/1.73 M^2
GENTAMICIN SERPL-MCNC: 2.1 UG/ML
GLUCOSE SERPL-MCNC: 96 MG/DL
LIPASE SERPL-CCNC: 26 U/L
POTASSIUM SERPL-SCNC: 3.5 MMOL/L
PROT SERPL-MCNC: 6 G/DL
SODIUM SERPL-SCNC: 137 MMOL/L

## 2017-11-14 PROCEDURE — 25000003 PHARM REV CODE 250: Performed by: OBSTETRICS & GYNECOLOGY

## 2017-11-14 PROCEDURE — 36415 COLL VENOUS BLD VENIPUNCTURE: CPT

## 2017-11-14 PROCEDURE — 80170 ASSAY OF GENTAMICIN: CPT

## 2017-11-14 PROCEDURE — 59025 FETAL NON-STRESS TEST: CPT | Mod: 26,,, | Performed by: OBSTETRICS & GYNECOLOGY

## 2017-11-14 PROCEDURE — 83690 ASSAY OF LIPASE: CPT

## 2017-11-14 PROCEDURE — 25000003 PHARM REV CODE 250: Performed by: STUDENT IN AN ORGANIZED HEALTH CARE EDUCATION/TRAINING PROGRAM

## 2017-11-14 PROCEDURE — G0378 HOSPITAL OBSERVATION PER HR: HCPCS

## 2017-11-14 PROCEDURE — 80053 COMPREHEN METABOLIC PANEL: CPT

## 2017-11-14 PROCEDURE — 99224 PR SUBSEQUENT OBSERVATION CARE,LEVEL I: CPT | Mod: 25,,, | Performed by: OBSTETRICS & GYNECOLOGY

## 2017-11-14 PROCEDURE — 76816 OB US FOLLOW-UP PER FETUS: CPT | Mod: 26,,, | Performed by: OBSTETRICS & GYNECOLOGY

## 2017-11-14 PROCEDURE — 82150 ASSAY OF AMYLASE: CPT

## 2017-11-14 RX ORDER — ACETAMINOPHEN 325 MG/1
650 TABLET ORAL EVERY 6 HOURS PRN
Status: DISCONTINUED | OUTPATIENT
Start: 2017-11-14 | End: 2017-11-15 | Stop reason: HOSPADM

## 2017-11-14 RX ORDER — HYDROCODONE BITARTRATE AND ACETAMINOPHEN 5; 325 MG/1; MG/1
1 TABLET ORAL ONCE
Status: COMPLETED | OUTPATIENT
Start: 2017-11-14 | End: 2017-11-14

## 2017-11-14 RX ORDER — OXYCODONE HYDROCHLORIDE 5 MG/1
10 TABLET ORAL ONCE
Status: COMPLETED | OUTPATIENT
Start: 2017-11-14 | End: 2017-11-14

## 2017-11-14 RX ADMIN — OXYCODONE HYDROCHLORIDE 10 MG: 5 TABLET ORAL at 01:11

## 2017-11-14 RX ADMIN — DIPHENHYDRAMINE HYDROCHLORIDE 25 MG: 25 CAPSULE ORAL at 04:11

## 2017-11-14 RX ADMIN — ACETAMINOPHEN 650 MG: 325 TABLET ORAL at 09:11

## 2017-11-14 RX ADMIN — ACETAMINOPHEN 650 MG: 325 TABLET ORAL at 12:11

## 2017-11-14 RX ADMIN — PRENATAL VIT W/ FE FUMARATE-FA TAB 27-0.8 MG 1 EACH: 27-0.8 TAB at 08:11

## 2017-11-14 RX ADMIN — HYDROCODONE BITARTRATE AND ACETAMINOPHEN 1 TABLET: 5; 325 TABLET ORAL at 04:11

## 2017-11-14 RX ADMIN — ACETAMINOPHEN 650 MG: 325 TABLET ORAL at 03:11

## 2017-11-14 RX ADMIN — DIPHENHYDRAMINE HYDROCHLORIDE 25 MG: 25 CAPSULE ORAL at 10:11

## 2017-11-14 RX ADMIN — HYDROCODONE BITARTRATE AND ACETAMINOPHEN 1 TABLET: 5; 325 TABLET ORAL at 10:11

## 2017-11-14 NOTE — SUBJECTIVE & OBJECTIVE
Obstetric HPI:  HD #2: Patient reports sever pain on left flank. Pain present on back and front beneath the rib cage. States tylenol did not help with pain overnight. Patient reports None contractions, active fetal movement, absent vaginal bleeding , absent loss of fluid      Objective:     Vital Signs (Most Recent):  Temp: 98 °F (36.7 °C) (11/14/17 0324)  Pulse: 84 (11/14/17 0324)  Resp: 16 (11/14/17 0324)  BP: (!) 109/57 (11/14/17 0324)  SpO2: 98 % (11/14/17 0324) Vital Signs (24h Range):  Temp:  [97.5 °F (36.4 °C)-98.2 °F (36.8 °C)] 98 °F (36.7 °C)  Pulse:  [] 84  Resp:  [16] 16  SpO2:  [93 %-100 %] 98 %  BP: ()/(51-59) 109/57     Weight: 90.7 kg (200 lb)  Body mass index is 30.41 kg/m².  No intake or output data in the 24 hours ending 11/14/17 0718    Significant Labs:  Recent Lab Results       11/14/17  0536 11/13/17  2030 11/13/17  1905      Color, UA        Bilirubin   negative     Spec Grav UA        pH, UA        Protein   negative     Urobilinogen, UA   normal     Nitrite, UA   +     Anion Gap  9      Appearance, UA   Hazy(A)     Bacteria, UA   Moderate(A)     Baso #  0.01      Basophil%  0.1      Bilirubin (UA)   Negative     BUN, Bld  6      Calcium  9.1      Chloride  106      CO2  22(L)      Color, UA   Yellow     Creatinine  0.5      Differential Method  Automated      eGFR if   >60      eGFR if non   >60  Comment:  Calculation used to obtain the estimated glomerular filtration  rate (eGFR) is the CKD-EPI equation.         Eos #  0.1      Eosinophil%  0.9      Gentamicin, Random 2.1  Comment:  Therapeutic range is not established for random specimens.       Glucose  80      Glucose, UA   normal     Glucose, UA   Negative     Gran #  9.3(H)      Gran%  79.9(H)      Group & Rh  O POS      Hematocrit  32.8(L)      Hemoglobin  10.9(L)      INDIRECT TOREY  NEG      Ketones, UA   Negative        negative     Leukocytes, UA   1+(A)     Lymph #  1.5      Lymph%   12.7(L)      MCH  30.4      MCHC  33.2      MCV  92      Microscopic Comment   SEE COMMENT  Comment:  Other formed elements not mentioned in the report are not   present in the microscopic examination.        Mono #  0.7      Mono%  6.0      MPV  10.9      Nitrite, UA   Positive(A)     Occult Blood UA   Negative     pH, UA   7.0     Platelets  267      Potassium  3.5      Protein, UA   Negative  Comment:  Recommend a 24 hour urine protein or a urine   protein/creatinine ratio if globulin induced proteinuria is  clinically suspected.       RBC  3.58(L)      RBC, UA   negative     RDW  13.9      Sodium  137      Specific Gravity, UA   <=1.005(A)     Specimen UA   Urine, Clean Catch     Squam Epithel, UA   6     Urobilinogen, UA   Negative     WBC, UA   10(H)        1+     WBC  11.66          Physical Exam:   Constitutional: She is oriented to person, place, and time. She appears well-developed and well-nourished.    HENT:   Head: Normocephalic and atraumatic.    Eyes: EOM are normal. Pupils are equal, round, and reactive to light.    Neck: Normal range of motion. Neck supple.    Cardiovascular: Normal rate, regular rhythm, normal heart sounds and intact distal pulses.     Pulmonary/Chest: Effort normal and breath sounds normal. No respiratory distress.        Abdominal: Soft.   +CVA tenderness on left, tenderness to palpation also present beneath left breast; no tenderness over left flank at the midaxillary line; no tenderness elicited on the right side     Genitourinary:   Genitourinary Comments: deferred           Musculoskeletal: Normal range of motion and moves all extremeties.       Neurological: She is alert and oriented to person, place, and time.     Psychiatric: She has a normal mood and affect. Her behavior is normal. Judgment and thought content normal.

## 2017-11-14 NOTE — ASSESSMENT & PLAN NOTE
Admit to ante service  Plan for Gent IV (Allergy to keflex, last UC sensitive to gent)   Follow up UCx   Monitor for resolution of CVA tenderness   After resolves, plan for outpatient ABX and Macrobid PPx for rest of pregnancy   NST BID

## 2017-11-14 NOTE — ASSESSMENT & PLAN NOTE
Continue Gent IV (Allergy to keflex, last UC sensitive to gent)   Follow up UCx   Monitor for resolution of CVA tenderness   After resolves, plan for outpatient ABX and Macrobid PPx for rest of pregnancy   NST BID  Patient complaining of severe pain this AM: retroperitoneal U/S ordered

## 2017-11-14 NOTE — PROGRESS NOTES
[facility]  Obstetrics  Antepartum Progress Note    Patient Name: Alena Willis  MRN: [id number]  Admission Date: 11/13/2017  Hospital Length of Stay: 0 days  Attending Physician: Lisette Baez MD  Primary Care Provider: Keena Pillai NP    Subjective:     Principal Problem:<principal problem not specified>    HPI:  Alena Willis is a 32 y.o. CF with history of recurrent UTIs who presents with left flank pain.   She is supposed to be on Macrobid PPX nightly as she had pyelo early this IUP. She is non compliant.   Last positive UCx was 10/19 -- Ecoli. She did not take prescribed Rx.  She has had E. Coli positive cultures since 8/2017.  She denies fever, chills, CP, SOB. She deneis N/V. She admits to dysuria.     Hospital Course:  11/13/2017 - admitted for mgmt of pyelo -- has Keflex allergy, will plan for Gent IV. Last UCx Ecoli, sensitive to gent. CBC/BMP pending.   11/14/2017 HD #2: Patient reports sever pain on left flank. Pain present on back and front beneath the rib cage. States tylenol did not help with pain overnight.     Obstetric HPI:  HD #2: Patient reports sever pain on left flank. Pain present on back and front beneath the rib cage. States tylenol did not help with pain overnight. Patient reports None contractions, active fetal movement, absent vaginal bleeding , absent loss of fluid      Objective:     Vital Signs (Most Recent):  Temp: 98 °F (36.7 °C) (11/14/17 0324)  Pulse: 84 (11/14/17 0324)  Resp: 16 (11/14/17 0324)  BP: (!) 109/57 (11/14/17 0324)  SpO2: 98 % (11/14/17 0324) Vital Signs (24h Range):  Temp:  [97.5 °F (36.4 °C)-98.2 °F (36.8 °C)] 98 °F (36.7 °C)  Pulse:  [] 84  Resp:  [16] 16  SpO2:  [93 %-100 %] 98 %  BP: ()/(51-59) 109/57     Weight: 90.7 kg (200 lb)  Body mass index is 30.41 kg/m².  No intake or output data in the 24 hours ending 11/14/17 0718    Significant Labs:  Recent Lab Results       11/14/17  0536 11/13/17  2030 11/13/17  1905      Color,  UA        Bilirubin   negative     Spec Grav UA        pH, UA        Protein   negative     Urobilinogen, UA   normal     Nitrite, UA   +     Anion Gap  9      Appearance, UA   Hazy(A)     Bacteria, UA   Moderate(A)     Baso #  0.01      Basophil%  0.1      Bilirubin (UA)   Negative     BUN, Bld  6      Calcium  9.1      Chloride  106      CO2  22(L)      Color, UA   Yellow     Creatinine  0.5      Differential Method  Automated      eGFR if   >60      eGFR if non   >60  Comment:  Calculation used to obtain the estimated glomerular filtration  rate (eGFR) is the CKD-EPI equation.         Eos #  0.1      Eosinophil%  0.9      Gentamicin, Random 2.1  Comment:  Therapeutic range is not established for random specimens.       Glucose  80      Glucose, UA   normal     Glucose, UA   Negative     Gran #  9.3(H)      Gran%  79.9(H)      Group & Rh  O POS      Hematocrit  32.8(L)      Hemoglobin  10.9(L)      INDIRECT TOREY  NEG      Ketones, UA   Negative        negative     Leukocytes, UA   1+(A)     Lymph #  1.5      Lymph%  12.7(L)      MCH  30.4      MCHC  33.2      MCV  92      Microscopic Comment   SEE COMMENT  Comment:  Other formed elements not mentioned in the report are not   present in the microscopic examination.        Mono #  0.7      Mono%  6.0      MPV  10.9      Nitrite, UA   Positive(A)     Occult Blood UA   Negative     pH, UA   7.0     Platelets  267      Potassium  3.5      Protein, UA   Negative  Comment:  Recommend a 24 hour urine protein or a urine   protein/creatinine ratio if globulin induced proteinuria is  clinically suspected.       RBC  3.58(L)      RBC, UA   negative     RDW  13.9      Sodium  137      Specific Gravity, UA   <=1.005(A)     Specimen UA   Urine, Clean Catch     Squam Epithel, UA   6     Urobilinogen, UA   Negative     WBC, UA   10(H)        1+     WBC  11.66          Physical Exam:   Constitutional: She is oriented to person, place, and time. She  appears well-developed and well-nourished.    HENT:   Head: Normocephalic and atraumatic.    Eyes: EOM are normal. Pupils are equal, round, and reactive to light.    Neck: Normal range of motion. Neck supple.    Cardiovascular: Normal rate, regular rhythm, normal heart sounds and intact distal pulses.     Pulmonary/Chest: Effort normal and breath sounds normal. No respiratory distress.        Abdominal: Soft.   +CVA tenderness on left, tenderness to palpation also present beneath left breast; no tenderness over left flank at the midaxillary line; no tenderness elicited on the right side     Genitourinary:   Genitourinary Comments: deferred           Musculoskeletal: Normal range of motion and moves all extremeties.       Neurological: She is alert and oriented to person, place, and time.     Psychiatric: She has a normal mood and affect. Her behavior is normal. Judgment and thought content normal.       Assessment/Plan:     32 y.o. female  at 30w0d for:    Glucose tolerance test abnormal    Set up for 3 Hr GTT on discharge         Pyelonephritis    Continue Gent IV (Allergy to keflex, last UC sensitive to gent)   Follow up UCx   Monitor for resolution of CVA tenderness   After resolves, plan for outpatient ABX and Macrobid PPx for rest of pregnancy   NST BID  Patient complaining of severe pain this AM: retroperitoneal U/S ordered            Mallory Santillan MD  Obstetrics  [facility]

## 2017-11-14 NOTE — HPI
Alena Willis is a 32 y.o. CF with history of recurrent UTIs who presents with left flank pain.   She is supposed to be on Macrobid PPX nightly as she had pyelo early this IUP. She is non compliant.   Last positive UCx was 10/19 -- Ecoli. She did not take prescribed Rx.  She has had E. Coli positive cultures since 8/2017.  She denies fever, chills, CP, SOB. She deneis N/V. She admits to dysuria.

## 2017-11-14 NOTE — SUBJECTIVE & OBJECTIVE
"Obstetric HPI:      Obstetric History       T1      L1     SAB1   TAB0   Ectopic0   Multiple0   Live Births1       # Outcome Date GA Lbr Darrell/2nd Weight Sex Delivery Anes PTL Lv   3 Current            2 SAB            1 Term      Vag-Spont   ALEXSANDER        Past Medical History:   Diagnosis Date    Anxiety     Asthma     Depression     History of diverticulitis     IBS (irritable bowel syndrome)      Past Surgical History:   Procedure Laterality Date    CERVIX SURGERY      removal of tumor    OVARIAN CYST REMOVAL           (Not in a hospital admission)    Review of patient's allergies indicates:   Allergen Reactions    Keflex [cephalexin] Swelling and Rash    Percocet [oxycodone-acetaminophen] Other (See Comments)     nausea, abdominal cramping - "just doesn't like taking it" - patient took morning of 2016          Family History     Problem Relation (Age of Onset)    Diabetes Paternal Grandfather, Paternal Grandmother, Maternal Grandmother, Maternal Grandfather, Father, Mother, Paternal Aunt, Paternal Uncle    Hypertension Paternal Grandfather, Paternal Grandmother, Maternal Grandmother, Maternal Grandfather, Father, Mother    Ovarian cancer Mother        Social History Main Topics    Smoking status: Current Every Day Smoker     Packs/day: 0.50     Years: 14.00     Types: Cigarettes    Smokeless tobacco: Never Used    Alcohol use No    Drug use: No    Sexual activity: Not Currently     Birth control/ protection: Rhythm     Review of Systems   Constitutional: Negative for chills and fever.   Respiratory: Negative for shortness of breath.    Cardiovascular: Negative for chest pain.   Gastrointestinal: Negative for abdominal pain, bloating, blood in stool, constipation, diarrhea, nausea and vomiting.   Genitourinary: Positive for dysuria, flank pain and urgency. Negative for hematuria, vaginal bleeding, vaginal discharge, vaginal pain and vaginal odor.      Objective:     Vital Signs (Most " Recent):  Temp: 97.5 °F (36.4 °C) (11/13/17 1918)  Pulse: 100 (11/13/17 1919)  BP: (!) 100/57 (11/13/17 1918)  SpO2: 96 % (11/13/17 1919) Vital Signs (24h Range):  Temp:  [97.5 °F (36.4 °C)] 97.5 °F (36.4 °C)  Pulse:  [] 100  SpO2:  [96 %] 96 %  BP: (100)/(57) 100/57        There is no height or weight on file to calculate BMI.    FHT: Cat 1 (reassuring)  TOCO:  None     Physical Exam:   Constitutional: She is oriented to person, place, and time. She appears well-developed and well-nourished.       Cardiovascular: Normal rate.     Pulmonary/Chest: Effort normal. No respiratory distress.        Abdominal: Soft.   +CVA tenderness on left                  Neurological: She is alert and oriented to person, place, and time.     Psychiatric: She has a normal mood and affect. Her behavior is normal. Judgment and thought content normal.       Cervix:  Def   Presentation: Vertex     Significant Labs:  Lab Results   Component Value Date    GROUPTRH O POS 02/02/2009    HEPBSAG NR 02/02/2016       BMP: No results for input(s): GLU, NA, K, CL, CO2, BUN, CREATININE, CALCIUM, MG in the last 48 hours.  CBC: No results for input(s): WBC, RBC, HGB, HCT, PLT, MCV, MCH, MCHC in the last 48 hours.  I have personallly reviewed all pertinent lab results from the last 24 hours.

## 2017-11-14 NOTE — HOSPITAL COURSE
11/13/2017 - admitted for mgmt of pyelo -- has Keflex allergy, will plan for Gent IV. Last UCx Ecoli, sensitive to gent. CBC/BMP pending.   11/14/2017 HD #2: Patient reports sever pain on left flank. Pain present on back and front beneath the rib cage. States tylenol did not help with pain overnight.   11/15/2017 HD #3: UCx pending. Clinically improved. Awaiting AM CMP. Patient is medically stable and desires discharge later today.

## 2017-11-14 NOTE — PLAN OF CARE
Problem: Patient Care Overview  Goal: Interdisciplinary Rounds/Family Conf  Outcome: Ongoing (interventions implemented as appropriate)  Pt report +FM. Pt continues to complain of left flank and UMQ pain.  LR infusing. UO BEATA. No falls or injury this shift.

## 2017-11-14 NOTE — H&P
"[facility]  Obstetrics  History & Physical    Patient Name: Alena Willis  MRN: [id number]  Admission Date: 2017  Primary Care Provider: Keena Pillai NP    Subjective:     Principal Problem: PYELO   History of Present Illness:  Alena Willis is a 32 y.o. CF with history of recurrent UTIs who presents with left flank pain.   She is supposed to be on Macrobid PPX nightly as she had pyelo early this IUP. She is non compliant.   Last positive UCx was 10/19 -- Ecoli. She did not take prescribed Rx.  She has had E. Coli positive cultures since 2017.  She denies fever, chills, CP, SOB. She deneis N/V. She admits to dysuria.     Obstetric HPI:      Obstetric History       T1      L1     SAB1   TAB0   Ectopic0   Multiple0   Live Births1       # Outcome Date GA Lbr Darrell/2nd Weight Sex Delivery Anes PTL Lv   3 Current            2 SAB            1 Term      Vag-Spont   ALEXSANDER        Past Medical History:   Diagnosis Date    Anxiety     Asthma     Depression     History of diverticulitis     IBS (irritable bowel syndrome)      Past Surgical History:   Procedure Laterality Date    CERVIX SURGERY      removal of tumor    OVARIAN CYST REMOVAL           (Not in a hospital admission)    Review of patient's allergies indicates:   Allergen Reactions    Keflex [cephalexin] Swelling and Rash    Percocet [oxycodone-acetaminophen] Other (See Comments)     nausea, abdominal cramping - "just doesn't like taking it" - patient took morning of 2016          Family History     Problem Relation (Age of Onset)    Diabetes Paternal Grandfather, Paternal Grandmother, Maternal Grandmother, Maternal Grandfather, Father, Mother, Paternal Aunt, Paternal Uncle    Hypertension Paternal Grandfather, Paternal Grandmother, Maternal Grandmother, Maternal Grandfather, Father, Mother    Ovarian cancer Mother        Social History Main Topics    Smoking status: Current Every Day Smoker     Packs/day: 0.50     " Years: 14.00     Types: Cigarettes    Smokeless tobacco: Never Used    Alcohol use No    Drug use: No    Sexual activity: Not Currently     Birth control/ protection: Rhythm     Review of Systems   Constitutional: Negative for chills and fever.   Respiratory: Negative for shortness of breath.    Cardiovascular: Negative for chest pain.   Gastrointestinal: Negative for abdominal pain, bloating, blood in stool, constipation, diarrhea, nausea and vomiting.   Genitourinary: Positive for dysuria, flank pain and urgency. Negative for hematuria, vaginal bleeding, vaginal discharge, vaginal pain and vaginal odor.      Objective:     Vital Signs (Most Recent):  Temp: 97.5 °F (36.4 °C) (11/13/17 1918)  Pulse: 100 (11/13/17 1919)  BP: (!) 100/57 (11/13/17 1918)  SpO2: 96 % (11/13/17 1919) Vital Signs (24h Range):  Temp:  [97.5 °F (36.4 °C)] 97.5 °F (36.4 °C)  Pulse:  [] 100  SpO2:  [96 %] 96 %  BP: (100)/(57) 100/57        There is no height or weight on file to calculate BMI.    FHT: Cat 1 (reassuring)  TOCO:  None     Physical Exam:   Constitutional: She is oriented to person, place, and time. She appears well-developed and well-nourished.       Cardiovascular: Normal rate.     Pulmonary/Chest: Effort normal. No respiratory distress.        Abdominal: Soft.   +CVA tenderness on left                  Neurological: She is alert and oriented to person, place, and time.     Psychiatric: She has a normal mood and affect. Her behavior is normal. Judgment and thought content normal.       Cervix:  Def   Presentation: Vertex     Significant Labs:  Lab Results   Component Value Date    GROUPTRH O POS 02/02/2009    HEPBSAG NR 02/02/2016       BMP: No results for input(s): GLU, NA, K, CL, CO2, BUN, CREATININE, CALCIUM, MG in the last 48 hours.  CBC: No results for input(s): WBC, RBC, HGB, HCT, PLT, MCV, MCH, MCHC in the last 48 hours.  I have personallly reviewed all pertinent lab results from the last 24  hours.    Assessment/Plan:     32 y.o. female  at 29w6d for:    Glucose tolerance test abnormal    Set up for 3 Hr GTT on discharge         Pyelonephritis    Admit to ante service  Plan for Gent IV (Allergy to keflex, last UC sensitive to gent)   Follow up UCx   Monitor for resolution of CVA tenderness   After resolves, plan for outpatient ABX and Macrobid PPx for rest of pregnancy   NST BID              Beata Parks MD  Obstetrics  [facility]

## 2017-11-14 NOTE — ED PROVIDER NOTES
"Encounter Date: 11/13/2017       History     Chief Complaint   Patient presents with    Flank Pain     left side & back     Alena Willis is a 32 y.o. CF with history of recurrent UTIs who presents with left flank pain.   She is supposed to be on Macrobid PPX nightly as she had pyelo early this IUP. She is non compliant.   Last positive UCx was 10/19 -- Ecoli. She did not take prescribed Rx.  She has had E. Coli positive cultures since 8/2017.  She denies fever, chills, CP, SOB. She deneis N/V. She admits to dysuria.           Review of patient's allergies indicates:   Allergen Reactions    Keflex [cephalexin] Swelling and Rash    Percocet [oxycodone-acetaminophen] Other (See Comments)     nausea, abdominal cramping - "just doesn't like taking it" - patient took morning of 2/2/2016       Past Medical History:   Diagnosis Date    Anxiety     Asthma     Depression     History of diverticulitis     IBS (irritable bowel syndrome)      Past Surgical History:   Procedure Laterality Date    CERVIX SURGERY      removal of tumor    OVARIAN CYST REMOVAL       Family History   Problem Relation Age of Onset    Diabetes Paternal Grandfather     Hypertension Paternal Grandfather     Diabetes Paternal Grandmother     Hypertension Paternal Grandmother     Diabetes Maternal Grandmother     Hypertension Maternal Grandmother     Diabetes Maternal Grandfather     Hypertension Maternal Grandfather     Diabetes Father     Hypertension Father     Diabetes Mother     Hypertension Mother     Ovarian cancer Mother      Had hysterectomy at age 37    Diabetes Paternal Aunt     Diabetes Paternal Uncle     Breast cancer Neg Hx     Colon cancer Neg Hx      Social History   Substance Use Topics    Smoking status: Current Every Day Smoker     Packs/day: 0.50     Years: 14.00     Types: Cigarettes    Smokeless tobacco: Never Used    Alcohol use No     Review of Systems   Constitutional: Negative for activity change, " fatigue and fever.   Respiratory: Negative for shortness of breath.    Cardiovascular: Negative for chest pain.   Gastrointestinal: Negative for abdominal pain, nausea and vomiting.   Genitourinary: Positive for dysuria, flank pain, frequency and urgency. Negative for decreased urine volume, vaginal bleeding, vaginal discharge and vaginal pain.   Musculoskeletal: Positive for back pain.   Skin: Negative for rash.   Neurological: Negative for headaches.   Psychiatric/Behavioral: The patient is not nervous/anxious.        Physical Exam   Temp:  [97.5 °F (36.4 °C)] 97.5 °F (36.4 °C)  Pulse:  [] 100  SpO2:  [96 %] 96 %  BP: (100)/(57) 100/57     Physical Exam    Vitals reviewed.  Constitutional: She appears well-developed and well-nourished. She is not diaphoretic. No distress.   Cardiovascular: Normal rate, regular rhythm, normal heart sounds and intact distal pulses.   Pulmonary/Chest: No respiratory distress.   Abdominal: Soft. There is no tenderness. There is no rebound and no guarding.   + LEFT CVA Tenderness    Neurological: She is alert and oriented to person, place, and time. She has normal strength. No sensory deficit.   Psychiatric: She has a normal mood and affect. Her behavior is normal. Judgment and thought content normal.       ED Course   Obtain Fetal nonstress test (NST)  Date/Time: 11/13/2017 7:32 PM  Performed by: ELY MCKEON  Authorized by: ELY MCKEON     Nonstress Test:     Variability:  6-25 BPM    Decelerations:  None    Accelerations:  10 bpm    Baseline:  125    Uterine Irritability: No      Contractions:  Not present  Biophysical Profile:     Nonstress Test Interpretation: reactive      Overall Impression:  Reassuring        Labs Reviewed   URINALYSIS - Abnormal; Notable for the following:        Result Value    Appearance, UA Hazy (*)     Specific Gravity, UA <=1.005 (*)     Nitrite, UA Positive (*)     Leukocytes, UA 1+ (*)     All other components within normal limits   URINALYSIS  MICROSCOPIC - Abnormal; Notable for the following:     WBC, UA 10 (*)     Bacteria, UA Moderate (*)     All other components within normal limits   CULTURE, URINE   CBC W/ AUTO DIFFERENTIAL   BASIC METABOLIC PANEL   POCT URINALYSIS, DIPSTICK OR TABLET REAGENT, AUTOMATED, WITH MICROSCOP   TYPE & SCREEN             Medical Decision Making:   Initial Assessment:   Flank pain w/ history of recurrent UTIs   Differential Diagnosis:   Pyelo vs UTI   Clinical Tests:   Lab Tests: Ordered  ED Management:  Left CVA tenderness, recurrent Ecoli UTI and non compliance. UDip here is +leuks and nitrites. Will admit for Mgmt of pyelo with ceftriaxone.   Other:   I have discussed this case with another health care provider.       <> Summary of the Discussion: Dr. Roberts , on call OB hospitalist                    ED Course      Clinical Impression:   The primary encounter diagnosis was Pyelonephritis. A diagnosis of 29 weeks gestation of pregnancy was also pertinent to this visit.                           Beata Parks MD  Resident  11/13/17 1952       Faith Roberts MD  11/13/17 2019

## 2017-11-15 VITALS
RESPIRATION RATE: 18 BRPM | HEIGHT: 68 IN | DIASTOLIC BLOOD PRESSURE: 56 MMHG | SYSTOLIC BLOOD PRESSURE: 97 MMHG | BODY MASS INDEX: 30.31 KG/M2 | TEMPERATURE: 98 F | WEIGHT: 200 LBS | OXYGEN SATURATION: 100 % | HEART RATE: 85 BPM

## 2017-11-15 LAB
ALBUMIN SERPL BCP-MCNC: 2.4 G/DL
ALP SERPL-CCNC: 85 U/L
ALT SERPL W/O P-5'-P-CCNC: 8 U/L
ANION GAP SERPL CALC-SCNC: 9 MMOL/L
AST SERPL-CCNC: 9 U/L
BILIRUB SERPL-MCNC: 0.2 MG/DL
BUN SERPL-MCNC: 5 MG/DL
CALCIUM SERPL-MCNC: 8.4 MG/DL
CHLORIDE SERPL-SCNC: 106 MMOL/L
CO2 SERPL-SCNC: 19 MMOL/L
CREAT SERPL-MCNC: 0.5 MG/DL
EST. GFR  (AFRICAN AMERICAN): >60 ML/MIN/1.73 M^2
EST. GFR  (NON AFRICAN AMERICAN): >60 ML/MIN/1.73 M^2
GLUCOSE SERPL-MCNC: 84 MG/DL
POTASSIUM SERPL-SCNC: 3.5 MMOL/L
PROT SERPL-MCNC: 6.2 G/DL
SODIUM SERPL-SCNC: 134 MMOL/L

## 2017-11-15 PROCEDURE — G0378 HOSPITAL OBSERVATION PER HR: HCPCS

## 2017-11-15 PROCEDURE — 99225 PR SUBSEQUENT OBSERVATION CARE,LEVEL II: CPT | Mod: 25,,, | Performed by: OBSTETRICS & GYNECOLOGY

## 2017-11-15 PROCEDURE — 80053 COMPREHEN METABOLIC PANEL: CPT

## 2017-11-15 PROCEDURE — 36415 COLL VENOUS BLD VENIPUNCTURE: CPT

## 2017-11-15 PROCEDURE — 63600175 PHARM REV CODE 636 W HCPCS: Performed by: STUDENT IN AN ORGANIZED HEALTH CARE EDUCATION/TRAINING PROGRAM

## 2017-11-15 PROCEDURE — 59025 FETAL NON-STRESS TEST: CPT | Mod: 26,,, | Performed by: OBSTETRICS & GYNECOLOGY

## 2017-11-15 PROCEDURE — 99223 1ST HOSP IP/OBS HIGH 75: CPT | Mod: AI,25,, | Performed by: OBSTETRICS & GYNECOLOGY

## 2017-11-15 PROCEDURE — 25000003 PHARM REV CODE 250: Performed by: OBSTETRICS & GYNECOLOGY

## 2017-11-15 PROCEDURE — 25000003 PHARM REV CODE 250: Performed by: STUDENT IN AN ORGANIZED HEALTH CARE EDUCATION/TRAINING PROGRAM

## 2017-11-15 RX ORDER — NITROFURANTOIN 25; 75 MG/1; MG/1
100 CAPSULE ORAL 2 TIMES DAILY
Qty: 20 CAPSULE | Refills: 0 | Status: SHIPPED | OUTPATIENT
Start: 2017-11-15 | End: 2017-11-25

## 2017-11-15 RX ORDER — NITROFURANTOIN 25; 75 MG/1; MG/1
100 CAPSULE ORAL DAILY
Qty: 30 CAPSULE | Refills: 3 | Status: SHIPPED | OUTPATIENT
Start: 2017-11-15 | End: 2017-12-15

## 2017-11-15 RX ADMIN — GENTAMICIN SULFATE 372 MG: 40 INJECTION, SOLUTION INTRAMUSCULAR; INTRAVENOUS at 12:11

## 2017-11-15 RX ADMIN — PRENATAL VIT W/ FE FUMARATE-FA TAB 27-0.8 MG 1 EACH: 27-0.8 TAB at 09:11

## 2017-11-15 NOTE — SUBJECTIVE & OBJECTIVE
Obstetric HPI:  HD #3: UCx pending. Clinically improved. Awaiting AM CMP. Patient is medically stable and desires discharge later today. Patient reports None contractions, active fetal movement, absent vaginal bleeding , absent loss of fluid      Objective:     Vital Signs (Most Recent):  Temp: 97.7 °F (36.5 °C) (11/15/17 0605)  Pulse: 86 (11/15/17 0605)  Resp: 18 (11/15/17 0438)  BP: (!) 99/56 (11/15/17 0605)  SpO2: 98 % (11/14/17 1606) Vital Signs (24h Range):  Temp:  [96.5 °F (35.8 °C)-97.8 °F (36.6 °C)] 97.7 °F (36.5 °C)  Pulse:  [] 86  Resp:  [18] 18  SpO2:  [98 %-100 %] 98 %  BP: ()/(52-59) 99/56     Weight: 90.7 kg (200 lb)  Body mass index is 30.41 kg/m².      Intake/Output Summary (Last 24 hours) at 11/15/17 0649  Last data filed at 11/15/17 0500   Gross per 24 hour   Intake          3208.33 ml   Output              870 ml   Net          2338.33 ml     Significant Labs:  Recent Lab Results       11/14/17  0853      Amylase 46     Lipase 26         Physical Exam:   Constitutional: She is oriented to person, place, and time. She appears well-developed and well-nourished.    HENT:   Head: Normocephalic and atraumatic.    Eyes: EOM are normal. Pupils are equal, round, and reactive to light.    Neck: Normal range of motion. Neck supple.    Cardiovascular: Normal rate, regular rhythm, normal heart sounds and intact distal pulses.     Pulmonary/Chest: Effort normal and breath sounds normal. No respiratory distress.        Abdominal: Soft. Bowel sounds are normal. She exhibits no distension. There is no tenderness. There is no rebound and no guarding.   CVA tenderness is improved on left, tenderness to palpation beneath left breast also resolving; no tenderness over left flank at the midaxillary line; no tenderness elicited on the right side     Genitourinary:   Genitourinary Comments: deferred           Musculoskeletal: Normal range of motion and moves all extremeties.       Neurological: She is alert  and oriented to person, place, and time.    Skin: Skin is warm and dry. No rash noted.    Psychiatric: She has a normal mood and affect. Her behavior is normal. Judgment and thought content normal.

## 2017-11-15 NOTE — DISCHARGE SUMMARY
[facility]  Obstetrics  Discharge Summary      Patient Name: Alena Willis  MRN: [id number]  Admission Date: 11/13/2017  Hospital Length of Stay: 0 days  Discharge Date and Time:  11/15/2017 2:29 PM  Attending Physician: Lisette Baez MD   Discharging Provider: Mallory Santillan MD  Primary Care Provider: Keena Pillai NP    HPI: Alena Willis is a 32 y.o. CF with history of recurrent UTIs who presents with left flank pain.   She is supposed to be on Macrobid PPX nightly as she had pyelo early this IUP. She is non compliant.   Last positive UCx was 10/19 -- Ecoli. She did not take prescribed Rx.  She has had E. Coli positive cultures since 8/2017.  She denies fever, chills, CP, SOB. She deneis N/V. She admits to dysuria.     * No surgery found *     Hospital Course:   11/13/2017 - admitted for mgmt of pyelo -- has Keflex allergy, will plan for Gent IV. Last UCx Ecoli, sensitive to gent. CBC/BMP pending.   11/14/2017 HD #2: Patient reports sever pain on left flank. Pain present on back and front beneath the rib cage. States tylenol did not help with pain overnight.   11/15/2017 HD #3: UCx pending. Clinically improved. Awaiting AM CMP. Patient is medically stable and desires discharge later today.        Final Active Diagnoses:    Diagnosis Date Noted POA    PRINCIPAL PROBLEM:  Pyelonephritis [N12] 11/13/2017 Yes    Glucose tolerance test abnormal [R73.02] 11/13/2017 Yes    Maternal asthma complicating pregnancy [O99.519, J45.909] 08/02/2017 Yes    Anxiety and depression [F41.8] 06/15/2015 Yes      Problems Resolved During this Admission:    Diagnosis Date Noted Date Resolved POA      Labs:   CMP   Recent Labs  Lab 11/13/17  2030 11/14/17  0536 11/15/17  0631    137 134*   K 3.5 3.5 3.5    108 106   CO2 22* 20* 19*   GLU 80 96 84   BUN 6 7 5*   CREATININE 0.5 0.6 0.5   CALCIUM 9.1 8.9 8.4*   PROT  --  6.0 6.2   ALBUMIN  --  2.3* 2.4*   BILITOT  --  0.1 0.2   ALKPHOS  --  80 85    AST  --  9* 9*   ALT  --  7* 8*   ANIONGAP 9 9 9   ESTGFRAFRICA >60 >60 >60   EGFRNONAA >60 >60 >60    and CBC   Recent Labs  Lab 11/13/17  2030   WBC 11.66   HGB 10.9*   HCT 32.8*          Immunizations     None          This patient has no babies on file.  Pending Diagnostic Studies:     Procedure Component Value Units Date/Time    US Retroperitoneal Complete (Kidney and [[id number]]     Order Status:  Sent Lab Status:  No result           Discharged Condition: good    Disposition:     Follow Up:  Follow-up Information     Mahogany Heard MD.    Specialties:  Obstetrics, Obstetrics and Gynecology  Why:  prenatal appointment as scheduled  Contact information:  [address]  [facility] [address] [phone]                 Patient Instructions:   No discharge procedures on file.  Medications:  Current Discharge Medication List      START taking these medications    Details   !! nitrofurantoin, macrocrystal-monohydrate, (MACROBID) 100 MG capsule Take 1 capsule (100 mg total) by mouth 2 (two) times daily.  Qty: 20 capsule, Refills: 0      !! nitrofurantoin, macrocrystal-monohydrate, (MACROBID) 100 MG capsule Take 1 capsule (100 mg total) by mouth once daily.  Qty: 30 capsule, Refills: 3       !! - Potential duplicate medications found. Please discuss with provider.      CONTINUE these medications which have NOT CHANGED    Details   cetirizine (ZYRTEC) 5 MG tablet Take 1 tablet (5 mg total) by mouth once daily.  Qty: 30 tablet, Refills: 12      ranitidine (ZANTAC) 150 MG tablet Take 1 tablet (150 mg total) by mouth 2 (two) times daily.  Qty: 60 tablet, Refills: 1      docusate sodium (COLACE) 100 MG capsule Take 1 capsule (100 mg total) by mouth 3 (three) times daily as needed for Constipation.  Qty: 60 capsule, Refills: 0      prenatal vit no.126-iron-folic (CLASSIC PRENATAL) 28 mg iron- 800 mcg Tab Take 1 tablet by mouth once daily.      VENTOLIN HFA 90 mcg/actuation inhaler Inhale 2 puffs into  the lungs every 6 (six) hours as needed.  Refills: 6             Mallory Santillan MD  Obstetrics  [facility]

## 2017-11-15 NOTE — ASSESSMENT & PLAN NOTE
S/p Gent IV x48 hours (Allergy to keflex, last UC sensitive to gent)   Follow up UCx   Monitor for resolution of CVA tenderness   After resolves, plan for outpatient ABX and Macrobid PPx for rest of pregnancy   NST BID  CXR and complete abdominal U/S were both normal  CMP yesterday was normal; awaiting AM CMP  Patient is medically stable and desires discharge to home today  RX for Macrobid BID x10 days followed by daily macrobid qd for the duration of pregnancy

## 2017-11-15 NOTE — PLAN OF CARE
"Problem: Patient Care Overview  Goal: Plan of Care Review  Pt VS stable, afebrile and pain controled with PO pain meds throughout the night, able to rest well with PO benadryl to fall asleep. 0515 pt request SL to "change her clothes and freshen up" . Pt stated she was going to go get her own "feminine hygiene " products out of her car. RN informed pt.she is not able to leave the building with an IV in place- pt verbalized understanding.Pt noted to be off unit [id number] stated she went to 2nd floor vending machine to get a snack.(VS stable upon return). Dr Santillan informed on rounds.      "

## 2017-11-15 NOTE — PROGRESS NOTES
[facility]  Obstetrics  Antepartum Progress Note    Patient Name: Alena Willis  MRN: [id number]  Admission Date: 11/13/2017  Hospital Length of Stay: 0 days  Attending Physician: Lisette Baez MD  Primary Care Provider: Keena Pillai NP    Subjective:     Principal Problem:<principal problem not specified>    HPI:  Alena Willis is a 32 y.o. CF with history of recurrent UTIs who presents with left flank pain.   She is supposed to be on Macrobid PPX nightly as she had pyelo early this IUP. She is non compliant.   Last positive UCx was 10/19 -- Ecoli. She did not take prescribed Rx.  She has had E. Coli positive cultures since 8/2017.  She denies fever, chills, CP, SOB. She deneis N/V. She admits to dysuria.     Hospital Course:  11/13/2017 - admitted for mgmt of pyelo -- has Keflex allergy, will plan for Gent IV. Last UCx Ecoli, sensitive to gent. CBC/BMP pending.   11/14/2017 HD #2: Patient reports sever pain on left flank. Pain present on back and front beneath the rib cage. States tylenol did not help with pain overnight.   11/15/2017 HD #3: UCx pending. Clinically improved. Awaiting AM CMP. Patient is medically stable and desires discharge later today.    Obstetric HPI:  HD #3: UCx pending. Clinically improved. Awaiting AM CMP. Patient is medically stable and desires discharge later today. Patient reports None contractions, active fetal movement, absent vaginal bleeding , absent loss of fluid      Objective:     Vital Signs (Most Recent):  Temp: 97.7 °F (36.5 °C) (11/15/17 0605)  Pulse: 86 (11/15/17 0605)  Resp: 18 (11/15/17 0438)  BP: (!) 99/56 (11/15/17 0605)  SpO2: 98 % (11/14/17 1606) Vital Signs (24h Range):  Temp:  [96.5 °F (35.8 °C)-97.8 °F (36.6 °C)] 97.7 °F (36.5 °C)  Pulse:  [] 86  Resp:  [18] 18  SpO2:  [98 %-100 %] 98 %  BP: ()/(52-59) 99/56     Weight: 90.7 kg (200 lb)  Body mass index is 30.41 kg/m².      Intake/Output Summary (Last 24 hours) at 11/15/17  0649  Last data filed at 11/15/17 0500   Gross per 24 hour   Intake          3208.33 ml   Output              870 ml   Net          2338.33 ml     Significant Labs:  Recent Lab Results       17  0853      Amylase 46     Lipase 26         Physical Exam:   Constitutional: She is oriented to person, place, and time. She appears well-developed and well-nourished.    HENT:   Head: Normocephalic and atraumatic.    Eyes: EOM are normal. Pupils are equal, round, and reactive to light.    Neck: Normal range of motion. Neck supple.    Cardiovascular: Normal rate, regular rhythm, normal heart sounds and intact distal pulses.     Pulmonary/Chest: Effort normal and breath sounds normal. No respiratory distress.        Abdominal: Soft. Bowel sounds are normal. She exhibits no distension. There is no tenderness. There is no rebound and no guarding.   CVA tenderness is improved on left, tenderness to palpation beneath left breast also resolving; no tenderness over left flank at the midaxillary line; no tenderness elicited on the right side     Genitourinary:   Genitourinary Comments: deferred           Musculoskeletal: Normal range of motion and moves all extremeties.       Neurological: She is alert and oriented to person, place, and time.    Skin: Skin is warm and dry. No rash noted.    Psychiatric: She has a normal mood and affect. Her behavior is normal. Judgment and thought content normal.       Assessment/Plan:     32 y.o. female  at 30w1d for:    Glucose tolerance test abnormal    Set up for 3 Hr GTT on discharge         Pyelonephritis    S/p Gent IV x48 hours (Allergy to keflex, last UC sensitive to gent)   Follow up UCx   Monitor for resolution of CVA tenderness   After resolves, plan for outpatient ABX and Macrobid PPx for rest of pregnancy   NST BID  CXR and complete abdominal U/S were both normal  CMP yesterday was normal; awaiting AM CMP  Patient is medically stable and desires discharge to home today  RX  for Macrobid BID x10 days followed by daily macrobid qd for the duration of pregnancy            Mallory Santillan MD  Obstetrics  [facility]

## 2017-11-16 ENCOUNTER — ROUTINE PRENATAL (OUTPATIENT)
Dept: OBSTETRICS AND GYNECOLOGY | Facility: CLINIC | Age: 32
End: 2017-11-16
Payer: MEDICAID

## 2017-11-16 VITALS
SYSTOLIC BLOOD PRESSURE: 112 MMHG | DIASTOLIC BLOOD PRESSURE: 70 MMHG | WEIGHT: 193.13 LBS | BODY MASS INDEX: 29.36 KG/M2

## 2017-11-16 DIAGNOSIS — O99.519 MATERNAL ASTHMA COMPLICATING PREGNANCY: ICD-10-CM

## 2017-11-16 DIAGNOSIS — J45.909 MATERNAL ASTHMA COMPLICATING PREGNANCY: ICD-10-CM

## 2017-11-16 DIAGNOSIS — Z34.03 ENCOUNTER FOR SUPERVISION OF NORMAL FIRST PREGNANCY IN THIRD TRIMESTER: Primary | ICD-10-CM

## 2017-11-16 DIAGNOSIS — R73.09 GLUCOSE TOLERANCE TEST ABNORMAL: ICD-10-CM

## 2017-11-16 DIAGNOSIS — N12 PYELONEPHRITIS: ICD-10-CM

## 2017-11-16 LAB — BACTERIA UR CULT: NORMAL

## 2017-11-16 PROCEDURE — 99999 PR PBB SHADOW E&M-EST. PATIENT-LVL III: CPT | Mod: PBBFAC,,, | Performed by: STUDENT IN AN ORGANIZED HEALTH CARE EDUCATION/TRAINING PROGRAM

## 2017-11-16 PROCEDURE — 99213 OFFICE O/P EST LOW 20 MIN: CPT | Mod: TH,S$PBB,, | Performed by: STUDENT IN AN ORGANIZED HEALTH CARE EDUCATION/TRAINING PROGRAM

## 2017-11-16 PROCEDURE — 99213 OFFICE O/P EST LOW 20 MIN: CPT | Mod: PBBFAC,PO | Performed by: STUDENT IN AN ORGANIZED HEALTH CARE EDUCATION/TRAINING PROGRAM

## 2017-11-16 NOTE — PROGRESS NOTES
Seen and examined.  Agree with note.  All questions answered.  She and FOB .  She has family support.  Declines to talk to social work

## 2017-11-16 NOTE — PROGRESS NOTES
Complaints today: Reporting continued neck and back pain. Was hospitalized recently for pyelonephritis (flank/back pain, +urine cx, no fever) Using tylenol and heating pads for neck pain but still having issues. No contractions, vaginal bleeding, LOF. Normal FM.    /70   Wt 87.6 kg (193 lb 2 oz)   LMP 2017   BMI 29.36 kg/m²     32 y.o., at 30w2d by Estimated Date of Delivery: 18  Patient Active Problem List   Diagnosis    Anxiety and depression    Pregnancy, supervision, normal, first/breast/vasectomy    Maternal asthma complicating pregnancy    Mental disorder affecting pregnancy    Pyelonephritis    Glucose tolerance test abnormal     OB History    Para Term  AB Living   3 1 1   1 1   SAB TAB Ectopic Multiple Live Births   1       1      # Outcome Date GA Lbr Darrell/2nd Weight Sex Delivery Anes PTL Lv   3 Current            2 SAB            1 Term      Vag-Spont   ALEXSANDER          Dating reviewed    Allergies and problem list reviewed and updated    Medical and surgical history reviewed    Prenatal labs reviewed and updated    Physical Exam:  ABD: soft, gravid, nontender    Assessment/Plan:    Alena MANNING was seen today for routine prenatal visit.    Diagnoses and all orders for this visit:    Encounter for supervision of normal first pregnancy in third trimester    Maternal asthma complicating pregnancy    Pyelonephritis    Glucose tolerance test abnormal  -     GLUCOSE TOLERANCE, 3 HOURS; Future    Other orders  -     Tdap Vaccine; Future    --continue Macrobid (started yesterday) for +urine cx  --recommend alternating heat and ice for neck pain  --f/u 2 weeks    Chang Mariscal MD  PGY1, OBGYN [facility]

## 2017-11-17 ENCOUNTER — LAB VISIT (OUTPATIENT)
Dept: LAB | Facility: OTHER | Age: 32
End: 2017-11-17
Attending: STUDENT IN AN ORGANIZED HEALTH CARE EDUCATION/TRAINING PROGRAM
Payer: MEDICAID

## 2017-11-17 DIAGNOSIS — R73.09 GLUCOSE TOLERANCE TEST ABNORMAL: ICD-10-CM

## 2017-11-17 LAB
GLUCOSE SERPL-MCNC: 111 MG/DL
GLUCOSE SERPL-MCNC: 126 MG/DL
GLUCOSE SERPL-MCNC: 181 MG/DL
GLUCOSE SERPL-MCNC: 96 MG/DL

## 2017-11-17 PROCEDURE — 36415 COLL VENOUS BLD VENIPUNCTURE: CPT

## 2017-11-17 PROCEDURE — 82951 GLUCOSE TOLERANCE TEST (GTT): CPT

## 2017-11-21 ENCOUNTER — PATIENT MESSAGE (OUTPATIENT)
Dept: OBSTETRICS AND GYNECOLOGY | Facility: CLINIC | Age: 32
End: 2017-11-21

## 2017-11-21 DIAGNOSIS — O24.410 DIET CONTROLLED GESTATIONAL DIABETES MELLITUS (GDM) IN THIRD TRIMESTER: ICD-10-CM

## 2017-11-24 ENCOUNTER — PATIENT MESSAGE (OUTPATIENT)
Dept: OBSTETRICS AND GYNECOLOGY | Facility: CLINIC | Age: 32
End: 2017-11-24

## 2017-11-27 NOTE — TELEPHONE ENCOUNTER
Called pt. Pt needs appt for diabetes education. Order entered by Dr. Heard. Called Lula Reyes in diabetes education. No answer. Left VM message.

## 2017-11-28 DIAGNOSIS — O24.419 GESTATIONAL DIABETES MELLITUS (GDM) IN THIRD TRIMESTER, GESTATIONAL DIABETES METHOD OF CONTROL UNSPECIFIED: Primary | ICD-10-CM

## 2017-11-30 ENCOUNTER — ROUTINE PRENATAL (OUTPATIENT)
Dept: OBSTETRICS AND GYNECOLOGY | Facility: CLINIC | Age: 32
End: 2017-11-30
Payer: MEDICAID

## 2017-11-30 ENCOUNTER — CLINICAL SUPPORT (OUTPATIENT)
Dept: OBSTETRICS AND GYNECOLOGY | Facility: CLINIC | Age: 32
End: 2017-11-30
Payer: MEDICAID

## 2017-11-30 VITALS — SYSTOLIC BLOOD PRESSURE: 100 MMHG | WEIGHT: 193.31 LBS | DIASTOLIC BLOOD PRESSURE: 60 MMHG | BODY MASS INDEX: 29.4 KG/M2

## 2017-11-30 DIAGNOSIS — O99.519 MATERNAL ASTHMA COMPLICATING PREGNANCY: ICD-10-CM

## 2017-11-30 DIAGNOSIS — O24.410 DIET CONTROLLED GESTATIONAL DIABETES MELLITUS (GDM) IN THIRD TRIMESTER: ICD-10-CM

## 2017-11-30 DIAGNOSIS — J45.909 MATERNAL ASTHMA COMPLICATING PREGNANCY: ICD-10-CM

## 2017-11-30 DIAGNOSIS — Z34.03 ENCOUNTER FOR SUPERVISION OF NORMAL FIRST PREGNANCY IN THIRD TRIMESTER: Primary | ICD-10-CM

## 2017-11-30 PROBLEM — R73.09 GLUCOSE TOLERANCE TEST ABNORMAL: Status: RESOLVED | Noted: 2017-11-13 | Resolved: 2017-11-30

## 2017-11-30 PROCEDURE — 99213 OFFICE O/P EST LOW 20 MIN: CPT | Mod: TH,S$PBB,, | Performed by: STUDENT IN AN ORGANIZED HEALTH CARE EDUCATION/TRAINING PROGRAM

## 2017-11-30 PROCEDURE — 90471 IMMUNIZATION ADMIN: CPT | Mod: PBBFAC

## 2017-11-30 PROCEDURE — 99213 OFFICE O/P EST LOW 20 MIN: CPT | Mod: PBBFAC,PO,25 | Performed by: STUDENT IN AN ORGANIZED HEALTH CARE EDUCATION/TRAINING PROGRAM

## 2017-11-30 PROCEDURE — 90715 TDAP VACCINE 7 YRS/> IM: CPT | Mod: PBBFAC

## 2017-11-30 PROCEDURE — 99999 PR PBB SHADOW E&M-EST. PATIENT-LVL III: CPT | Mod: PBBFAC,,, | Performed by: STUDENT IN AN ORGANIZED HEALTH CARE EDUCATION/TRAINING PROGRAM

## 2017-11-30 PROCEDURE — 99212 OFFICE O/P EST SF 10 MIN: CPT | Mod: PBBFAC,27

## 2017-11-30 PROCEDURE — 99999 PR PBB SHADOW E&M-EST. PATIENT-LVL II: CPT | Mod: PBBFAC,,,

## 2017-11-30 RX ORDER — MICONAZOLE NITRATE 2 %
CREAM WITH APPLICATOR VAGINAL
COMMUNITY
Start: 2017-11-27 | End: 2017-12-14 | Stop reason: ALTCHOICE

## 2017-11-30 NOTE — PROGRESS NOTES
Complaints today: Patient complaining of yeast infection following antibiotics for UTI. She is using monostat which is improving her symptoms. Denies ctx, lof, or vb. Good fm. Patient has chronic neck/back pain.    /60   Wt 87.7 kg (193 lb 5.5 oz)   LMP 2017   BMI 29.40 kg/m²     32 y.o., at 32w2d by Estimated Date of Delivery: 18  Patient Active Problem List   Diagnosis    Anxiety and depression    Pregnancy, supervision, normal, first/breast/vasectomy, flu    Maternal asthma complicating pregnancy    Mental disorder affecting pregnancy    Pyelonephritis    Diet controlled gestational diabetes mellitus (GDM) in third trimester     OB History    Para Term  AB Living   3 1 1   1 1   SAB TAB Ectopic Multiple Live Births   1       1      # Outcome Date GA Lbr Darrell/2nd Weight Sex Delivery Anes PTL Lv   3 Current            2 SAB            1 Term      Vag-Spont   ALEXSANDER          Dating reviewed    Allergies and problem list reviewed and updated    Medical and surgical history reviewed    Prenatal labs reviewed and updated    Physical Exam:  ABD: soft, gravid, nontender    Assessment:  Alena MANNING was seen today for routine prenatal visit.    Diagnoses and all orders for this visit:    Encounter for supervision of normal first pregnancy in third trimester  -     Ambulatory consult to Physical Therapy    Maternal asthma complicating pregnancy  -     Ambulatory consult to Physical Therapy    Diet controlled gestational diabetes mellitus (GDM) in third trimester  -     Ambulatory consult to Physical Therapy      Plan:   - Ambulatory consult to PT placed for neck/back pain.  - Patient to get her TDAP vaccine today  - Follow up 2 weeks, kick counts, labor precautions

## 2017-12-01 ENCOUNTER — HOSPITAL ENCOUNTER (OUTPATIENT)
Dept: DIABETES | Facility: OTHER | Age: 32
Discharge: HOME | End: 2017-12-01
Attending: OBSTETRICS & GYNECOLOGY
Payer: MEDICAID

## 2017-12-01 VITALS — BODY MASS INDEX: 29.3 KG/M2 | WEIGHT: 192.69 LBS

## 2017-12-01 DIAGNOSIS — O24.410 DIET CONTROLLED GESTATIONAL DIABETES MELLITUS (GDM) IN THIRD TRIMESTER: Primary | ICD-10-CM

## 2017-12-01 PROCEDURE — G0108 DIAB MANAGE TRN  PER INDIV: HCPCS

## 2017-12-01 NOTE — PROGRESS NOTES
Diabetes Education  Author: Ivana Reyes RN  Date: 12/1/2017    Diabetes Education Visit  Diabetes Education Record Assessment/Progress: Initial    Diabetes Type  Diabetes Type : Gestational    Diabetes History  Diabetes Diagnosis: 0-1 year    Nutrition  Meal Plan 24 Hour Recall - Breakfast: skipped - yesterday had sausage egg biscuit (homemade) - apple juice   Meal Plan 24 Hour Recall - Lunch: skipped -   Meal Plan 24 Hour Recall - Dinner: grilled chicken, broccoli w/ fettucini pasta kiara (lean cuisine) - water   Meal Plan 24 Hour Recall - Snack: 2 small apple pies     Monitoring   Self Monitoring : meter ed today   Blood Glucose Logs: No    Exercise   Exercise Type:  (9 year old, busy and active)    Current Diabetes Treatment   Current Treatment: Diet    Social History  Preferred Learning Method: Hands On  Primary Support: Self  Educational Level: Some College  Occupation: none now, was in customer service   Smoking Status: Ex Smoker  Alcohol Use: Never                                Barriers to Change  Barriers to Change: None  Learning Challenges : None    Readiness to Learn   Readiness to Learn : Acceptance    Cultural Influences  Cultural Influences: No    Diabetes Education Assessment/Progress  Diabetes Disease Process (diabetes disease process and treatment options): Discussion, Written Materials Provided, Individual Session, Comprehends Key Points (ed on what GDM is, tx plan )  Nutrition (Incorporating nutritional management into one's lifestyle): Discussion, Written Materials Provided, Individual Session, Requires Assistance Family/SO, Demonstration (ed on CHO counting, plate method, no sweet drinks, portion CHO )  Physical Activity (incorporating physical activity into one's lifestyle): Discussion, Written Materials Provided, Individual Session, Requires Assistance Family/SO (ed on ADA recs for physical activity)  Medications (states correct name, dose, onset, peak, duration, side effects & timing of  meds): Discussion, Written Materials Provided, Individual Session, Requires Assistance Family/SO (ed on potential meds used during pregnancy, familiar - parents and grandparents diabetic)  Monitoring (monitoring blood glucose/other parameters & using results): Discussion, Written Materials Provided, Individual Session, Requires Assistance Family/SO, Demonstration (meter ed, fasting 77 - new logs given and pt instructed to SMBG fasting and 2 hr PP and bring logs to all future appointments)  Acute Complications (preventing, detecting, and treating acute complications): Discussion, Written Materials Provided, Individual Session, Requires Assistance Family/SO (ed on s/s of low BG, how to prevent/treat during pregnancy)  Chronic Complications (preventing, detecting, and treating chronic complications): Discussion, Written Materials Provided, Individual Session, Requires Assistance Family/SO (discussed potential complications r/t GDM and risk for type II DM later on in life, stressed importance of f/u w/ PCP)  Clinical (diabetes and other pertinent medical history): Discussion, Written Materials Provided, Individual Session, Requires Assistance Family/SO  Cognitive (knowledge of self-management skills, functional health literacy): Discussion, Written Materials Provided, Individual Session, Requires Assistance Family/SO (good literacy, parents both diabetic/grandparents - pt helps caretake)  Psychosocial (emotional response to diabetes): Discussion, Written Materials Provided, Individual Session, Requires Assistance Family/SO  Diabetes Distress and Support Systems: Discussion, Written Materials Provided, Individual Session, Requires Assistance Family/SO  Behavioral (readiness for change, lifestyle practices, self-care behaviors): Discussion, Written Materials Provided, Individual Session, Requires Assistance Family/SO (has already started changes prior to appointment, eager to learn and ready to make  choices)    Goals  Patient has selected/evaluated goals during today's session: Yes, selected  Healthy Eating: Set (pt will eliminate sweet drinks from diet and portion CHO w/ each meal)  Start Date: 12/01/17  Monitoring: Set (will SMBG 4x/day and bring logs to all appointments)  Start Date: 12/01/17         Diabetes Care Plan/Intervention  Education Plan/Intervention: Individual Follow-Up DSMT    Diabetes Meal Plan  Restrictions: Restricted Carbohydrate    Education Units of Time   Time Spent: 60 min      Health Maintenance Topics with due status: Not Due       Topic Last Completion Date    Pap Smear 08/02/2017    TETANUS VACCINE 11/30/2017     Health Maintenance Due   Topic Date Due    Pneumococcal PPSV23 (Medium Risk) (1) 02/19/2003

## 2017-12-06 ENCOUNTER — TELEPHONE (OUTPATIENT)
Dept: OBSTETRICS AND GYNECOLOGY | Facility: CLINIC | Age: 32
End: 2017-12-06

## 2017-12-06 NOTE — TELEPHONE ENCOUNTER
----- Message from Andre Enamorado sent at 12/6/2017 11:57 AM CST -----  Ob pt needs to talk to nurse about getting diabetic meter. Pt can be reached at [phone].

## 2017-12-06 NOTE — TELEPHONE ENCOUNTER
Returned pt call and pt stated she did not get her diabetic meter. Gave pt number to diabetic clinic to check the status.

## 2017-12-07 ENCOUNTER — CLINICAL SUPPORT (OUTPATIENT)
Dept: REHABILITATION | Facility: HOSPITAL | Age: 32
End: 2017-12-07
Attending: STUDENT IN AN ORGANIZED HEALTH CARE EDUCATION/TRAINING PROGRAM
Payer: MEDICAID

## 2017-12-07 ENCOUNTER — TELEPHONE (OUTPATIENT)
Dept: DIABETES | Facility: OTHER | Age: 32
End: 2017-12-07

## 2017-12-07 DIAGNOSIS — M25.512 CHRONIC PAIN OF BOTH SHOULDERS: ICD-10-CM

## 2017-12-07 DIAGNOSIS — M25.511 CHRONIC PAIN OF BOTH SHOULDERS: ICD-10-CM

## 2017-12-07 DIAGNOSIS — O24.410 DIET CONTROLLED GESTATIONAL DIABETES MELLITUS (GDM) IN THIRD TRIMESTER: Primary | ICD-10-CM

## 2017-12-07 DIAGNOSIS — G89.29 CHRONIC PAIN OF BOTH SHOULDERS: ICD-10-CM

## 2017-12-07 DIAGNOSIS — M54.2 NECK PAIN: ICD-10-CM

## 2017-12-07 PROCEDURE — 97161 PT EVAL LOW COMPLEX 20 MIN: CPT

## 2017-12-07 PROCEDURE — 97110 THERAPEUTIC EXERCISES: CPT

## 2017-12-07 PROCEDURE — 97140 MANUAL THERAPY 1/> REGIONS: CPT

## 2017-12-07 RX ORDER — INSULIN PUMP SYRINGE, 3 ML
EACH MISCELLANEOUS
Qty: 1 EACH | Refills: 0 | Status: SHIPPED | OUTPATIENT
Start: 2017-12-07
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
## 2017-12-07 NOTE — PATIENT INSTRUCTIONS
Strengthening: Lateral Bend - Isometric (in Neutral)        Using light pressure from fingertips, press into right temple. Resist bending head sideways. Hold 5 seconds.  Repeat 10 times per set. Do 3 sets per session. Do 2-3 sessions per day.     [email or web address]://[email or web address][email or web address]/302     Copyright © [employer]. All rights reserved.      Flexibility: Neck Retraction        Pull head straight back, keeping eyes and jaw level.  Repeat 10 times per set. Do 3 sets per session. Do 2-3 sessions per day.     https://[employer]/344     Copyright © [employer]. All rights reserved.        Suboccipital Stretch (Supine)        With small towel roll at base of skull and upper neck. Gently tuck chin until stretch is felt at base of skull and upper neck. Hold 5 seconds. Relax.  Repeat 10 times per set. Do 3 sets per session. Do 2-3 sessions per day.     https://[employer]/984     Copyright © [employer]. All rights reserved.   Upper Cervical Extension (Sitting)        With hands clasped firmly behind neck, gently bend head back. Do not allow movement under or below hands. Hold 5-10 seconds. Relax.  Repeat 10 times per set. Do 3 sets per session. Do 2-3 sessions per day.     https://[employer]/986     Copyright © [employer]. All rights reserved.

## 2017-12-14 ENCOUNTER — ROUTINE PRENATAL (OUTPATIENT)
Dept: OBSTETRICS AND GYNECOLOGY | Facility: CLINIC | Age: 32
End: 2017-12-14
Payer: MEDICAID

## 2017-12-14 VITALS — SYSTOLIC BLOOD PRESSURE: 124 MMHG | BODY MASS INDEX: 29.5 KG/M2 | WEIGHT: 194 LBS | DIASTOLIC BLOOD PRESSURE: 60 MMHG

## 2017-12-14 DIAGNOSIS — Z34.03 ENCOUNTER FOR SUPERVISION OF NORMAL FIRST PREGNANCY IN THIRD TRIMESTER: Primary | ICD-10-CM

## 2017-12-14 DIAGNOSIS — O99.519 MATERNAL ASTHMA COMPLICATING PREGNANCY: ICD-10-CM

## 2017-12-14 DIAGNOSIS — O24.410 DIET CONTROLLED GESTATIONAL DIABETES MELLITUS (GDM) IN THIRD TRIMESTER: ICD-10-CM

## 2017-12-14 DIAGNOSIS — J45.909 MATERNAL ASTHMA COMPLICATING PREGNANCY: ICD-10-CM

## 2017-12-14 PROCEDURE — 87086 URINE CULTURE/COLONY COUNT: CPT

## 2017-12-14 PROCEDURE — 99213 OFFICE O/P EST LOW 20 MIN: CPT | Mod: PBBFAC,PO | Performed by: STUDENT IN AN ORGANIZED HEALTH CARE EDUCATION/TRAINING PROGRAM

## 2017-12-14 PROCEDURE — 99999 PR PBB SHADOW E&M-EST. PATIENT-LVL III: CPT | Mod: PBBFAC,,, | Performed by: STUDENT IN AN ORGANIZED HEALTH CARE EDUCATION/TRAINING PROGRAM

## 2017-12-14 PROCEDURE — 99213 OFFICE O/P EST LOW 20 MIN: CPT | Mod: TH,S$PBB,, | Performed by: STUDENT IN AN ORGANIZED HEALTH CARE EDUCATION/TRAINING PROGRAM

## 2017-12-14 RX ORDER — CALCIUM CITRATE/VITAMIN D3 200MG-6.25
TABLET ORAL
COMMUNITY
Start: 2017-12-07

## 2017-12-14 RX ORDER — GLUCOSAM/CHON-MSM1/C/MANG/BOSW 500-416.6
TABLET ORAL
COMMUNITY
Start: 2017-12-07

## 2017-12-14 RX ORDER — BLOOD-GLUCOSE METER
EACH MISCELLANEOUS
Refills: 0 | COMMUNITY
Start: 2017-12-07

## 2017-12-14 NOTE — PROGRESS NOTES
Complaints today: Patient complains of fatigue, abdominal pain in her upper abdomen which she says has been constant for several weeks, and palpitations. She denies chest pain or acute onset SOB, headaches, vision changes. Denies ctx, LOF, or vaginal bleeding and notes good fetal movement.  She has been tracking her BG which have ranged from 70s in the morning to mostly 120's with two readings in the 130s. Sugars seem to be well-controlled.    /60   Wt 88 kg (194 lb 0.1 oz)   LMP 2017   BMI 29.50 kg/m²     32 y.o., at 34w2d by Estimated Date of Delivery: 18  Patient Active Problem List   Diagnosis    Anxiety and depression    Pregnancy, supervision, normal, first/breast/vasectomy, flu    Maternal asthma complicating pregnancy    Mental disorder affecting pregnancy    Pyelonephritis    Diet controlled gestational diabetes mellitus (GDM) in third trimester    Neck pain    Shoulder pain, bilateral     OB History    Para Term  AB Living   3 1 1   1 1   SAB TAB Ectopic Multiple Live Births   1       1      # Outcome Date GA Lbr Darrell/2nd Weight Sex Delivery Anes PTL Lv   3 Current            2 SAB            1 Term      Vag-Spont   ALEXSANDER          Dating reviewed    Allergies and problem list reviewed and updated    Medical and surgical history reviewed    Prenatal labs reviewed and updated    Physical Exam:  ABD: soft, gravid, nontender  CV: RRR, no gallops or murmurs.  Lungs: CTA B/L    Assessment:  Alena was seen today for routine prenatal visit.    Diagnoses and all orders for this visit:    Encounter for supervision of normal first pregnancy in third trimester  -     Urine culture    Maternal asthma complicating pregnancy    Diet controlled gestational diabetes mellitus (GDM) in third trimester    Plan:   - Urine culture 2/2 Udip showing +2 blood, +2 protein, and +1 leuks.  - continue macrobid ppx 2/2 h/o pyelonephritis during this pregnancy  - follow up 1 Weeks, kick counts,  labor precautions

## 2017-12-15 LAB — BACTERIA UR CULT: NORMAL

## 2017-12-20 ENCOUNTER — ROUTINE PRENATAL (OUTPATIENT)
Dept: OBSTETRICS AND GYNECOLOGY | Facility: CLINIC | Age: 32
End: 2017-12-20
Payer: MEDICAID

## 2017-12-20 VITALS — WEIGHT: 193.31 LBS | SYSTOLIC BLOOD PRESSURE: 110 MMHG | DIASTOLIC BLOOD PRESSURE: 70 MMHG | BODY MASS INDEX: 29.4 KG/M2

## 2017-12-20 DIAGNOSIS — O26.843 UTERINE SIZE-DATE DISCREPANCY IN THIRD TRIMESTER: ICD-10-CM

## 2017-12-20 DIAGNOSIS — O24.410 DIET CONTROLLED GESTATIONAL DIABETES MELLITUS (GDM) IN THIRD TRIMESTER: ICD-10-CM

## 2017-12-20 DIAGNOSIS — Z34.03 ENCOUNTER FOR SUPERVISION OF NORMAL FIRST PREGNANCY IN THIRD TRIMESTER: Primary | ICD-10-CM

## 2017-12-20 DIAGNOSIS — O99.519 MATERNAL ASTHMA COMPLICATING PREGNANCY: ICD-10-CM

## 2017-12-20 DIAGNOSIS — J45.909 MATERNAL ASTHMA COMPLICATING PREGNANCY: ICD-10-CM

## 2017-12-20 PROCEDURE — 99213 OFFICE O/P EST LOW 20 MIN: CPT | Mod: TH,S$PBB,, | Performed by: OBSTETRICS & GYNECOLOGY

## 2017-12-20 PROCEDURE — 87081 CULTURE SCREEN ONLY: CPT

## 2017-12-20 PROCEDURE — 99213 OFFICE O/P EST LOW 20 MIN: CPT | Mod: PBBFAC,PO | Performed by: OBSTETRICS & GYNECOLOGY

## 2017-12-20 PROCEDURE — 99999 PR PBB SHADOW E&M-EST. PATIENT-LVL III: CPT | Mod: PBBFAC,,, | Performed by: OBSTETRICS & GYNECOLOGY

## 2017-12-20 NOTE — PROGRESS NOTES
Complaints today: none  Good fm.  Denies ctx, vb, lof.    /70   Wt 87.7 kg (193 lb 5.5 oz)   LMP 2017   BMI 29.40 kg/m²     32 y.o., at 35w1d by Estimated Date of Delivery: 18  Patient Active Problem List   Diagnosis    Anxiety and depression    Pregnancy, supervision, normal, first/breast/vasectomy, flu    Maternal asthma complicating pregnancy    Mental disorder affecting pregnancy    Pyelonephritis    Diet controlled gestational diabetes mellitus (GDM) in third trimester    Neck pain    Shoulder pain, bilateral     OB History    Para Term  AB Living   3 1 1   1 1   SAB TAB Ectopic Multiple Live Births   1       1      # Outcome Date GA Lbr Darrell/2nd Weight Sex Delivery Anes PTL Lv   3 Current            2 SAB            1 Term      Vag-Spont   ALEXSANDER          Dating reviewed    Allergies and problem list reviewed and updated    Medical and surgical history reviewed    Prenatal labs reviewed and updated    Physical Exam:  ABD: soft, gravid, nontender,     Assessment:  Alena was seen today for routine prenatal visit.    Diagnoses and all orders for this visit:    Encounter for supervision of normal first pregnancy in third trimester  -     CBC auto differential; Future  -     HIV-1 and HIV-2 antibodies; Future  -     RPR; Future  -     Strep B Screen, Vaginal / Rectal    Maternal asthma complicating pregnancy    Diet controlled gestational diabetes mellitus (GDM) in third trimester    Uterine size-date discrepancy in third trimester  -     US MFM Procedure (Viewpoint); Future         Plan:   follow up labs and growth.  Sugars fine on diet.   follow up 2Weeks, kick counts, labor precautions

## 2017-12-22 LAB — BACTERIA SPEC AEROBE CULT: NORMAL

## 2017-12-26 ENCOUNTER — LAB VISIT (OUTPATIENT)
Dept: LAB | Facility: OTHER | Age: 32
End: 2017-12-26
Attending: OBSTETRICS & GYNECOLOGY
Payer: MEDICAID

## 2017-12-26 DIAGNOSIS — Z34.03 ENCOUNTER FOR SUPERVISION OF NORMAL FIRST PREGNANCY IN THIRD TRIMESTER: ICD-10-CM

## 2017-12-26 LAB
BASOPHILS # BLD AUTO: 0.02 K/UL
BASOPHILS NFR BLD: 0.2 %
DIFFERENTIAL METHOD: ABNORMAL
EOSINOPHIL # BLD AUTO: 0.1 K/UL
EOSINOPHIL NFR BLD: 0.5 %
ERYTHROCYTE [DISTWIDTH] IN BLOOD BY AUTOMATED COUNT: 14.5 %
HCT VFR BLD AUTO: 34.6 %
HGB BLD-MCNC: 11.2 G/DL
HIV 1+2 AB+HIV1 P24 AG SERPL QL IA: NEGATIVE
LYMPHOCYTES # BLD AUTO: 1.3 K/UL
LYMPHOCYTES NFR BLD: 10.2 %
MCH RBC QN AUTO: 29.5 PG
MCHC RBC AUTO-ENTMCNC: 32.4 G/DL
MCV RBC AUTO: 91 FL
MONOCYTES # BLD AUTO: 0.9 K/UL
MONOCYTES NFR BLD: 6.9 %
NEUTROPHILS # BLD AUTO: 10.3 K/UL
NEUTROPHILS NFR BLD: 81.7 %
PLATELET # BLD AUTO: 244 K/UL
PMV BLD AUTO: 11.5 FL
RBC # BLD AUTO: 3.8 M/UL
RPR SER QL: NORMAL
WBC # BLD AUTO: 12.55 K/UL

## 2017-12-26 PROCEDURE — 36415 COLL VENOUS BLD VENIPUNCTURE: CPT

## 2017-12-26 PROCEDURE — 86703 HIV-1/HIV-2 1 RESULT ANTBDY: CPT

## 2017-12-26 PROCEDURE — 85025 COMPLETE CBC W/AUTO DIFF WBC: CPT

## 2017-12-26 PROCEDURE — 86592 SYPHILIS TEST NON-TREP QUAL: CPT

## 2018-01-04 ENCOUNTER — ROUTINE PRENATAL (OUTPATIENT)
Dept: OBSTETRICS AND GYNECOLOGY | Facility: CLINIC | Age: 33
End: 2018-01-04
Payer: MEDICAID

## 2018-01-04 VITALS — BODY MASS INDEX: 30.4 KG/M2 | DIASTOLIC BLOOD PRESSURE: 70 MMHG | WEIGHT: 199.94 LBS | SYSTOLIC BLOOD PRESSURE: 122 MMHG

## 2018-01-04 DIAGNOSIS — O99.519 MATERNAL ASTHMA COMPLICATING PREGNANCY: ICD-10-CM

## 2018-01-04 DIAGNOSIS — J45.909 MATERNAL ASTHMA COMPLICATING PREGNANCY: ICD-10-CM

## 2018-01-04 DIAGNOSIS — Z34.03 ENCOUNTER FOR SUPERVISION OF NORMAL FIRST PREGNANCY IN THIRD TRIMESTER: Primary | ICD-10-CM

## 2018-01-04 DIAGNOSIS — O24.410 DIET CONTROLLED GESTATIONAL DIABETES MELLITUS (GDM) IN THIRD TRIMESTER: ICD-10-CM

## 2018-01-04 PROCEDURE — 99999 PR PBB SHADOW E&M-EST. PATIENT-LVL III: CPT | Mod: PBBFAC,,, | Performed by: OBSTETRICS & GYNECOLOGY

## 2018-01-04 PROCEDURE — 99213 OFFICE O/P EST LOW 20 MIN: CPT | Mod: TH,S$PBB,, | Performed by: OBSTETRICS & GYNECOLOGY

## 2018-01-04 PROCEDURE — 99213 OFFICE O/P EST LOW 20 MIN: CPT | Mod: PBBFAC,PO | Performed by: OBSTETRICS & GYNECOLOGY

## 2018-01-04 RX ORDER — NITROFURANTOIN 25; 75 MG/1; MG/1
CAPSULE ORAL
COMMUNITY
Start: 2017-12-29

## 2018-01-04 NOTE — PROGRESS NOTES
Complaints today: Patient complains of painful hemorrhoids. Patient is using cream prescribed to her by gastroenterologists but states it is not helping as much lately.  Denies ctx, vaginal bleeding, or LOF and notes good fetal movement. Blood sugars are well controlled at this time.    /70   Wt 90.7 kg (199 lb 15.3 oz)   LMP 2017   BMI 30.40 kg/m²     32 y.o., at 37w2d by Estimated Date of Delivery: 18  Patient Active Problem List   Diagnosis    Anxiety and depression    Pregnancy, supervision, normal, first/breast/vasectomy, flu    Maternal asthma complicating pregnancy    Mental disorder affecting pregnancy    Pyelonephritis    Diet controlled gestational diabetes mellitus (GDM) in third trimester    Neck pain    Shoulder pain, bilateral     OB History    Para Term  AB Living   3 1 1   1 1   SAB TAB Ectopic Multiple Live Births   1       1      # Outcome Date GA Lbr Darrell/2nd Weight Sex Delivery Anes PTL Lv   3 Current            2 SAB            1 Term      Vag-Spont   ALEXSANDER          Dating reviewed    Allergies and problem list reviewed and updated    Medical and surgical history reviewed    Prenatal labs reviewed and updated    Physical Exam:  ABD: soft, gravid, nontender    Assessment:  Alena was seen today for routine prenatal visit and hemorrhoids.    Diagnoses and all orders for this visit:    Encounter for supervision of normal first pregnancy in third trimester    Maternal asthma complicating pregnancy    Diet controlled gestational diabetes mellitus (GDM) in third trimester      Plan:   - will have patient email her current medication for hemorrhoids  - patient has growth ultrasound scheduled for next week  - f/u 1 week, bleeding/pain kick counts, labor precautions

## 2018-01-08 ENCOUNTER — OFFICE VISIT (OUTPATIENT)
Dept: MATERNAL FETAL MEDICINE | Facility: CLINIC | Age: 33
End: 2018-01-08
Attending: OBSTETRICS & GYNECOLOGY
Payer: MEDICAID

## 2018-01-08 DIAGNOSIS — O40.3XX0 POLYHYDRAMNIOS IN THIRD TRIMESTER COMPLICATION, SINGLE OR UNSPECIFIED FETUS: ICD-10-CM

## 2018-01-08 DIAGNOSIS — O24.410 DIET CONTROLLED GESTATIONAL DIABETES MELLITUS (GDM) IN THIRD TRIMESTER: ICD-10-CM

## 2018-01-08 DIAGNOSIS — O26.843 UTERINE SIZE-DATE DISCREPANCY IN THIRD TRIMESTER: ICD-10-CM

## 2018-01-08 DIAGNOSIS — Z36.4 ANTENATAL SCREENING FOR FETAL GROWTH RETARDATION USING ULTRASONICS: ICD-10-CM

## 2018-01-08 PROCEDURE — 76816 OB US FOLLOW-UP PER FETUS: CPT | Mod: PBBFAC | Performed by: OBSTETRICS & GYNECOLOGY

## 2018-01-08 PROCEDURE — 76816 OB US FOLLOW-UP PER FETUS: CPT | Mod: 26,S$PBB,, | Performed by: OBSTETRICS & GYNECOLOGY

## 2018-01-08 PROCEDURE — 76819 FETAL BIOPHYS PROFIL W/O NST: CPT | Mod: PBBFAC | Performed by: OBSTETRICS & GYNECOLOGY

## 2018-01-08 PROCEDURE — 99212 OFFICE O/P EST SF 10 MIN: CPT | Mod: 25,TH,S$PBB, | Performed by: OBSTETRICS & GYNECOLOGY

## 2018-01-08 PROCEDURE — 76819 FETAL BIOPHYS PROFIL W/O NST: CPT | Mod: 26,S$PBB,, | Performed by: OBSTETRICS & GYNECOLOGY

## 2018-01-08 NOTE — PROGRESS NOTES
Indication  ========    Size> dates and GDM -diet: ultrasound for EFW, MVP, and overall fetal well being (Dr. Mahogany Heard).    History  ======    General History  Other: Admit: Maternal Pyelonephritis  GDM: Diet Controlled    Method  ======    Transabdominal ultrasound examination. View: Good view.    Pregnancy  =========    Roy pregnancy. Number of fetuses: 1.    Dating  ======    LMP on: 4/18/2017  Cycle: regular cycle  GA by LMP 37 w + 6 d  FADY by LMP: 1/23/2018  Ultrasound examination on: 1/8/2018  GA by U/S based upon: AC, BPD, Femur, HC  GA by U/S 37 w + 5 d  FADY by U/S: 1/24/2018  Assigned: Dating performed on 08/31/2017, based on the LMP  Assigned GA 37 w + 6 d  Assigned FADY: 1/23/2018    General Evaluation  ==============    Cardiac activity: present.  bpm.  Fetal movements: visualized.  Presentation: cephalic.  Placenta:  Placental site: anterior.  Umbilical cord: Cord vessels: 3 vessel cord.  Amniotic fluid: Amount of AF: polyhydramnios. MVP 8.8 cm. WILBUR 27.3 cm. Q1 4.7 cm, Q2 8.8 cm, Q3 6.6 cm, Q4 7.3 cm.    Biophysical Profile  ==============    2: Fetal breathing movements  2: Gross body movements  2: Fetal tone  2: Amniotic fluid volume  8/8: Biophysical profile score    Fetal Biometry  ============    Fetal Biometry  BPD 91.9 mm 37w 2d Hadlock  .6 mm 39w 4d Kalpesh  .7 mm 37w 6d Hadlock  .2 mm 39w 2d Hadlock  Femur 70.7 mm 36w 2d Hadlock  EFW 3,418 g 64% Sea  Calculated by: Hadlock (BPD-HC-AC-FL)  EFW (lb) 7 lb  EFW (oz) 9 oz  Cephalic index 0.80  HC / AC 0.94  FL / BPD 0.77  FL / AC 0.20  MVP 8.8 cm  WILBUR 27.3 cm   bpm    Fetal Anatomy  ===========    Cranium: normal  4-chamber view: normal  Stomach: normal  Kidneys: normal  Bladder: normal  Wants to know gender: yes  Other: A full anatomy survey previously performed.    Consultation  ==========    Today I reviewed with the patient the finding of polyhydramnios. Polyhydramnios can be seen in up to 3.3% of all  pregnancies. Causes of  polyhydramnios include idiopathic (65%), diabetes mellitus (28%), congenital anomalies (4.3%) and multiple gestation (2.6%). Patient has  GDM that is diet controlled. No pathological cause of polyhydramnios could be found today and thus I would recommend routine care with plan  for delivery at 39 weeks gestation (pregnancy not to proceed beyond FADY) and earlier as clinically indicated. Though the evidence of an  improved fetal outcome is unclear, recommend fetal surveillance until delivery. Patient's questions were answered    I overall spent approximately 10 minutes in face to face time with the patient, greater than 50% of which was in counseling and care  coordination.    Impression  =========    1. 37w 6d haley intrauterine pregnancy  2. Overall, interval fetal growth wnl (EFW = 3418 gms at 64%):  (AC 93%)  3. Polyhydramnios with WILBUR = 27.3cm  4. Limited f/u anatomy: no abnormalities appreciated - see above for details  5. BPP without NST = 8/8  6. Cephalic presentation .    Recommendation  ==============    I spoke to Dr. Mahogany Heard concerning today's ultrasound evaluation and recommendations    1. Polyhydramnios:  -recommend fetal surveillance (ex. MVP/NST or BPP weekly) until delivery  -recommend delivery during 39 wks gestation (do not recommend pregnancy proceed beyond FADY).

## 2018-01-10 ENCOUNTER — TELEPHONE (OUTPATIENT)
Dept: OBSTETRICS AND GYNECOLOGY | Facility: CLINIC | Age: 33
End: 2018-01-10

## 2018-01-10 ENCOUNTER — ANESTHESIA EVENT (OUTPATIENT)
Dept: OBSTETRICS AND GYNECOLOGY | Facility: OTHER | Age: 33
End: 2018-01-10
Payer: MEDICAID

## 2018-01-10 ENCOUNTER — ANESTHESIA (OUTPATIENT)
Dept: OBSTETRICS AND GYNECOLOGY | Facility: OTHER | Age: 33
End: 2018-01-10
Payer: MEDICAID

## 2018-01-10 ENCOUNTER — PATIENT MESSAGE (OUTPATIENT)
Dept: OBSTETRICS AND GYNECOLOGY | Facility: CLINIC | Age: 33
End: 2018-01-10

## 2018-01-10 ENCOUNTER — HOSPITAL ENCOUNTER (INPATIENT)
Facility: OTHER | Age: 33
LOS: 3 days | Discharge: HOME | End: 2018-01-13
Attending: OBSTETRICS & GYNECOLOGY | Admitting: OBSTETRICS & GYNECOLOGY
Payer: MEDICAID

## 2018-01-10 DIAGNOSIS — O36.8390 FETAL TACHYCARDIA AFFECTING MANAGEMENT OF MOTHER: ICD-10-CM

## 2018-01-10 DIAGNOSIS — O99.340 DEPRESSION DURING PREGNANCY, ANTEPARTUM: ICD-10-CM

## 2018-01-10 DIAGNOSIS — F41.9 ANXIETY: ICD-10-CM

## 2018-01-10 DIAGNOSIS — O40.3XX0 POLYHYDRAMNIOS AFFECTING PREGNANCY IN THIRD TRIMESTER: ICD-10-CM

## 2018-01-10 DIAGNOSIS — F32.A DEPRESSION DURING PREGNANCY, ANTEPARTUM: ICD-10-CM

## 2018-01-10 DIAGNOSIS — J45.909 ASTHMA, UNSPECIFIED ASTHMA SEVERITY, UNSPECIFIED WHETHER COMPLICATED, UNSPECIFIED WHETHER PERSISTENT: ICD-10-CM

## 2018-01-10 DIAGNOSIS — Z3A.38 38 WEEKS GESTATION OF PREGNANCY: ICD-10-CM

## 2018-01-10 LAB
ABO + RH BLD: NORMAL
ALBUMIN SERPL BCP-MCNC: 2.4 G/DL
ALP SERPL-CCNC: 130 U/L
ALT SERPL W/O P-5'-P-CCNC: 8 U/L
ANION GAP SERPL CALC-SCNC: 10 MMOL/L
AST SERPL-CCNC: 10 U/L
BASOPHILS # BLD AUTO: ABNORMAL K/UL
BASOPHILS NFR BLD: 0 %
BILIRUB SERPL-MCNC: 0.2 MG/DL
BLD GP AB SCN CELLS X3 SERPL QL: NORMAL
BUN SERPL-MCNC: 11 MG/DL
CALCIUM SERPL-MCNC: 9 MG/DL
CHLORIDE SERPL-SCNC: 105 MMOL/L
CO2 SERPL-SCNC: 20 MMOL/L
CREAT SERPL-MCNC: 0.6 MG/DL
DIFFERENTIAL METHOD: ABNORMAL
EOSINOPHIL # BLD AUTO: ABNORMAL K/UL
EOSINOPHIL NFR BLD: 0 %
ERYTHROCYTE [DISTWIDTH] IN BLOOD BY AUTOMATED COUNT: 14.7 %
EST. GFR  (AFRICAN AMERICAN): >60 ML/MIN/1.73 M^2
EST. GFR  (NON AFRICAN AMERICAN): >60 ML/MIN/1.73 M^2
GLUCOSE SERPL-MCNC: 107 MG/DL
HCT VFR BLD AUTO: 33.4 %
HGB BLD-MCNC: 11 G/DL
LYMPHOCYTES # BLD AUTO: ABNORMAL K/UL
LYMPHOCYTES NFR BLD: 8 %
MCH RBC QN AUTO: 29.4 PG
MCHC RBC AUTO-ENTMCNC: 32.9 G/DL
MCV RBC AUTO: 89 FL
MONOCYTES # BLD AUTO: ABNORMAL K/UL
MONOCYTES NFR BLD: 5 %
NEUTROPHILS NFR BLD: 86 %
NEUTS BAND NFR BLD MANUAL: 1 %
PLATELET # BLD AUTO: 252 K/UL
PLATELET BLD QL SMEAR: ABNORMAL
PMV BLD AUTO: 12 FL
POCT GLUCOSE: 133 MG/DL (ref 70–110)
POLYCHROMASIA BLD QL SMEAR: ABNORMAL
POTASSIUM SERPL-SCNC: 3.6 MMOL/L
PROT SERPL-MCNC: 6.9 G/DL
RBC # BLD AUTO: 3.74 M/UL
SODIUM SERPL-SCNC: 135 MMOL/L
WBC # BLD AUTO: 13.38 K/UL

## 2018-01-10 PROCEDURE — 99285 EMERGENCY DEPT VISIT HI MDM: CPT | Mod: 25

## 2018-01-10 PROCEDURE — 99284 EMERGENCY DEPT VISIT MOD MDM: CPT | Mod: 25,,, | Performed by: OBSTETRICS & GYNECOLOGY

## 2018-01-10 PROCEDURE — 27200710 HC EPIDURAL INFUSION PUMP SET: Performed by: STUDENT IN AN ORGANIZED HEALTH CARE EDUCATION/TRAINING PROGRAM

## 2018-01-10 PROCEDURE — 11000001 HC ACUTE MED/SURG PRIVATE ROOM

## 2018-01-10 PROCEDURE — 86850 RBC ANTIBODY SCREEN: CPT

## 2018-01-10 PROCEDURE — 81000 URINALYSIS NONAUTO W/SCOPE: CPT

## 2018-01-10 PROCEDURE — 63600175 PHARM REV CODE 636 W HCPCS: Performed by: STUDENT IN AN ORGANIZED HEALTH CARE EDUCATION/TRAINING PROGRAM

## 2018-01-10 PROCEDURE — 80053 COMPREHEN METABOLIC PANEL: CPT

## 2018-01-10 PROCEDURE — 62326 NJX INTERLAMINAR LMBR/SAC: CPT | Performed by: ANESTHESIOLOGY

## 2018-01-10 PROCEDURE — 82962 GLUCOSE BLOOD TEST: CPT

## 2018-01-10 PROCEDURE — 25000003 PHARM REV CODE 250: Performed by: STUDENT IN AN ORGANIZED HEALTH CARE EDUCATION/TRAINING PROGRAM

## 2018-01-10 PROCEDURE — 85027 COMPLETE CBC AUTOMATED: CPT

## 2018-01-10 PROCEDURE — 59025 FETAL NON-STRESS TEST: CPT | Mod: 26,,, | Performed by: OBSTETRICS & GYNECOLOGY

## 2018-01-10 PROCEDURE — 36415 COLL VENOUS BLD VENIPUNCTURE: CPT

## 2018-01-10 PROCEDURE — 85007 BL SMEAR W/DIFF WBC COUNT: CPT

## 2018-01-10 PROCEDURE — 59409 OBSTETRICAL CARE: CPT | Mod: AA,,, | Performed by: ANESTHESIOLOGY

## 2018-01-10 PROCEDURE — 82570 ASSAY OF URINE CREATININE: CPT

## 2018-01-10 PROCEDURE — 59025 FETAL NON-STRESS TEST: CPT

## 2018-01-10 PROCEDURE — 27800517 HC TRAY,EPIDURAL-CONTINUOUS: Performed by: STUDENT IN AN ORGANIZED HEALTH CARE EDUCATION/TRAINING PROGRAM

## 2018-01-10 PROCEDURE — 87340 HEPATITIS B SURFACE AG IA: CPT

## 2018-01-10 RX ORDER — IPRATROPIUM BROMIDE AND ALBUTEROL SULFATE 2.5; .5 MG/3ML; MG/3ML
3 SOLUTION RESPIRATORY (INHALATION) EVERY 4 HOURS PRN
Status: DISCONTINUED | OUTPATIENT
Start: 2018-01-10 | End: 2018-01-11

## 2018-01-10 RX ORDER — FENTANYL/BUPIVACAINE/NS/PF 2MCG/ML-.1
PLASTIC BAG, INJECTION (ML) INJECTION CONTINUOUS PRN
Status: DISCONTINUED | OUTPATIENT
Start: 2018-01-10 | End: 2018-01-11

## 2018-01-10 RX ORDER — ONDANSETRON 8 MG/1
8 TABLET, ORALLY DISINTEGRATING ORAL EVERY 8 HOURS PRN
Status: DISCONTINUED | OUTPATIENT
Start: 2018-01-10 | End: 2018-01-11

## 2018-01-10 RX ORDER — SODIUM CHLORIDE 9 MG/ML
INJECTION, SOLUTION INTRAVENOUS
Status: DISCONTINUED | OUTPATIENT
Start: 2018-01-10 | End: 2018-01-11

## 2018-01-10 RX ORDER — OXYTOCIN/RINGER'S LACTATE 20/1000 ML
20 PLASTIC BAG, INJECTION (ML) INTRAVENOUS
Status: DISCONTINUED | OUTPATIENT
Start: 2018-01-10 | End: 2018-01-11

## 2018-01-10 RX ORDER — FAMOTIDINE 10 MG/ML
20 INJECTION INTRAVENOUS ONCE
Status: DISCONTINUED | OUTPATIENT
Start: 2018-01-11 | End: 2018-01-11

## 2018-01-10 RX ORDER — BUPIVACAINE HYDROCHLORIDE 2.5 MG/ML
INJECTION, SOLUTION EPIDURAL; INFILTRATION; INTRACAUDAL
Status: DISPENSED
Start: 2018-01-10 | End: 2018-01-11

## 2018-01-10 RX ORDER — LIDOCAINE HYDROCHLORIDE AND EPINEPHRINE 15; 5 MG/ML; UG/ML
INJECTION, SOLUTION EPIDURAL
Status: DISCONTINUED | OUTPATIENT
Start: 2018-01-10 | End: 2018-01-11

## 2018-01-10 RX ORDER — FENTANYL CITRATE 50 UG/ML
INJECTION, SOLUTION INTRAMUSCULAR; INTRAVENOUS
Status: COMPLETED
Start: 2018-01-10 | End: 2018-01-10

## 2018-01-10 RX ORDER — OXYTOCIN/RINGER'S LACTATE 20/1000 ML
333 PLASTIC BAG, INJECTION (ML) INTRAVENOUS CONTINUOUS
Status: DISCONTINUED | OUTPATIENT
Start: 2018-01-10 | End: 2018-01-11

## 2018-01-10 RX ORDER — SODIUM CITRATE AND CITRIC ACID MONOHYDRATE 334; 500 MG/5ML; MG/5ML
30 SOLUTION ORAL ONCE
Status: DISCONTINUED | OUTPATIENT
Start: 2018-01-11 | End: 2018-01-11

## 2018-01-10 RX ORDER — FENTANYL CITRATE 50 UG/ML
INJECTION, SOLUTION INTRAMUSCULAR; INTRAVENOUS
Status: DISCONTINUED | OUTPATIENT
Start: 2018-01-10 | End: 2018-01-11

## 2018-01-10 RX ORDER — FENTANYL/BUPIVACAINE/NS/PF 2MCG/ML-.1
PLASTIC BAG, INJECTION (ML) INJECTION
Status: DISPENSED
Start: 2018-01-10 | End: 2018-01-11

## 2018-01-10 RX ORDER — OXYTOCIN/RINGER'S LACTATE 20/1000 ML
41.7 PLASTIC BAG, INJECTION (ML) INTRAVENOUS CONTINUOUS
Status: DISCONTINUED | OUTPATIENT
Start: 2018-01-10 | End: 2018-01-11

## 2018-01-10 RX ADMIN — Medication 10 ML: at 11:01

## 2018-01-10 RX ADMIN — Medication 10 ML/HR: at 11:01

## 2018-01-10 RX ADMIN — LIDOCAINE HYDROCHLORIDE,EPINEPHRINE BITARTRATE 3 ML: 15; .005 INJECTION, SOLUTION EPIDURAL; INFILTRATION; INTRACAUDAL; PERINEURAL at 11:01

## 2018-01-10 RX ADMIN — FENTANYL CITRATE 100 MCG: 50 INJECTION, SOLUTION INTRAMUSCULAR; INTRAVENOUS at 11:01

## 2018-01-10 NOTE — TELEPHONE ENCOUNTER
CALLED PT AND ADVISED HER TO GO TO THE ED L&D. PT STATED SHE WILL GO AS SOON AS SHE PUT ON CLOTHES.

## 2018-01-11 LAB
BACTERIA #/AREA URNS HPF: ABNORMAL /HPF
BILIRUB UR QL STRIP: NEGATIVE
CLARITY UR: CLEAR
COLOR UR: YELLOW
CREAT UR-MCNC: 139.4 MG/DL
GLUCOSE UR QL STRIP: NEGATIVE
HBV SURFACE AG SERPL QL IA: NEGATIVE
HGB UR QL STRIP: ABNORMAL
KETONES UR QL STRIP: ABNORMAL
LEUKOCYTE ESTERASE UR QL STRIP: NEGATIVE
MICROSCOPIC COMMENT: ABNORMAL
NITRITE UR QL STRIP: NEGATIVE
PH UR STRIP: 7 [PH] (ref 5–8)
POCT GLUCOSE: 86 MG/DL (ref 70–110)
PROT UR QL STRIP: NEGATIVE
PROT UR-MCNC: 31 MG/DL
PROT/CREAT RATIO, UR: 0.22
RBC #/AREA URNS HPF: 10 /HPF (ref 0–4)
SP GR UR STRIP: 1.02 (ref 1–1.03)
SQUAMOUS #/AREA URNS HPF: 5 /HPF
TRI-PHOS CRY URNS QL MICRO: ABNORMAL
URN SPEC COLLECT METH UR: ABNORMAL
UROBILINOGEN UR STRIP-ACNC: 1 EU/DL
WBC #/AREA URNS HPF: 3 /HPF (ref 0–5)

## 2018-01-11 PROCEDURE — 10907ZC DRAINAGE OF AMNIOTIC FLUID, THERAPEUTIC FROM PRODUCTS OF CONCEPTION, VIA NATURAL OR ARTIFICIAL OPENING: ICD-10-PCS | Performed by: OBSTETRICS & GYNECOLOGY

## 2018-01-11 PROCEDURE — 63600175 PHARM REV CODE 636 W HCPCS: Performed by: STUDENT IN AN ORGANIZED HEALTH CARE EDUCATION/TRAINING PROGRAM

## 2018-01-11 PROCEDURE — 72200005 HC VAGINAL DELIVERY LEVEL II

## 2018-01-11 PROCEDURE — 51702 INSERT TEMP BLADDER CATH: CPT

## 2018-01-11 PROCEDURE — 59409 OBSTETRICAL CARE: CPT | Mod: AT,,, | Performed by: OBSTETRICS & GYNECOLOGY

## 2018-01-11 PROCEDURE — 72100002 HC LABOR CARE, 1ST 8 HOURS

## 2018-01-11 PROCEDURE — 25000003 PHARM REV CODE 250: Performed by: STUDENT IN AN ORGANIZED HEALTH CARE EDUCATION/TRAINING PROGRAM

## 2018-01-11 PROCEDURE — 11000001 HC ACUTE MED/SURG PRIVATE ROOM

## 2018-01-11 PROCEDURE — 0KQM0ZZ REPAIR PERINEUM MUSCLE, OPEN APPROACH: ICD-10-PCS | Performed by: OBSTETRICS & GYNECOLOGY

## 2018-01-11 RX ORDER — HYDROCODONE BITARTRATE AND ACETAMINOPHEN 10; 325 MG/1; MG/1
1 TABLET ORAL EVERY 6 HOURS PRN
Status: DISCONTINUED | OUTPATIENT
Start: 2018-01-11 | End: 2018-01-11

## 2018-01-11 RX ORDER — MISOPROSTOL 200 UG/1
800 TABLET ORAL
Status: DISCONTINUED | OUTPATIENT
Start: 2018-01-11 | End: 2018-01-13 | Stop reason: HOSPADM

## 2018-01-11 RX ORDER — DIPHENHYDRAMINE HYDROCHLORIDE 50 MG/ML
25 INJECTION INTRAMUSCULAR; INTRAVENOUS EVERY 4 HOURS PRN
Status: DISCONTINUED | OUTPATIENT
Start: 2018-01-11 | End: 2018-01-13 | Stop reason: HOSPADM

## 2018-01-11 RX ORDER — DOCUSATE SODIUM 100 MG/1
200 CAPSULE, LIQUID FILLED ORAL 2 TIMES DAILY PRN
Status: DISCONTINUED | OUTPATIENT
Start: 2018-01-11 | End: 2018-01-13 | Stop reason: HOSPADM

## 2018-01-11 RX ORDER — OXYTOCIN/RINGER'S LACTATE 20/1000 ML
20 PLASTIC BAG, INJECTION (ML) INTRAVENOUS ONCE
Status: DISCONTINUED | OUTPATIENT
Start: 2018-01-11 | End: 2018-01-11

## 2018-01-11 RX ORDER — OXYTOCIN/RINGER'S LACTATE 20/1000 ML
2 PLASTIC BAG, INJECTION (ML) INTRAVENOUS CONTINUOUS
Status: DISCONTINUED | OUTPATIENT
Start: 2018-01-11 | End: 2018-01-11

## 2018-01-11 RX ORDER — HYDROCODONE BITARTRATE AND ACETAMINOPHEN 10; 325 MG/1; MG/1
1 TABLET ORAL EVERY 6 HOURS PRN
Status: DISCONTINUED | OUTPATIENT
Start: 2018-01-11 | End: 2018-01-12

## 2018-01-11 RX ORDER — METHYLERGONOVINE MALEATE 0.2 MG/ML
200 INJECTION INTRAVENOUS
Status: DISCONTINUED | OUTPATIENT
Start: 2018-01-11 | End: 2018-01-13 | Stop reason: HOSPADM

## 2018-01-11 RX ORDER — IBUPROFEN 600 MG/1
600 TABLET ORAL EVERY 6 HOURS
Status: DISCONTINUED | OUTPATIENT
Start: 2018-01-11 | End: 2018-01-11

## 2018-01-11 RX ORDER — CARBOPROST TROMETHAMINE 250 UG/ML
250 INJECTION, SOLUTION INTRAMUSCULAR
Status: DISCONTINUED | OUTPATIENT
Start: 2018-01-11 | End: 2018-01-13 | Stop reason: HOSPADM

## 2018-01-11 RX ORDER — CALCIUM CARBONATE 200(500)MG
1000 TABLET,CHEWABLE ORAL ONCE
Status: COMPLETED | OUTPATIENT
Start: 2018-01-11 | End: 2018-01-11

## 2018-01-11 RX ORDER — OXYTOCIN/RINGER'S LACTATE 20/1000 ML
41.65 PLASTIC BAG, INJECTION (ML) INTRAVENOUS CONTINUOUS
Status: DISCONTINUED | OUTPATIENT
Start: 2018-01-11 | End: 2018-01-11

## 2018-01-11 RX ORDER — MISOPROSTOL 200 UG/1
TABLET ORAL
Status: DISPENSED
Start: 2018-01-11 | End: 2018-01-11

## 2018-01-11 RX ORDER — OXYTOCIN 10 [USP'U]/ML
INJECTION, SOLUTION INTRAMUSCULAR; INTRAVENOUS
Status: DISPENSED
Start: 2018-01-11 | End: 2018-01-11

## 2018-01-11 RX ORDER — NALBUPHINE HYDROCHLORIDE 10 MG/ML
10 INJECTION, SOLUTION INTRAMUSCULAR; INTRAVENOUS; SUBCUTANEOUS ONCE
Status: COMPLETED | OUTPATIENT
Start: 2018-01-11 | End: 2018-01-11

## 2018-01-11 RX ORDER — OXYTOCIN/RINGER'S LACTATE 20/1000 ML
41.65 PLASTIC BAG, INJECTION (ML) INTRAVENOUS CONTINUOUS
Status: ACTIVE | OUTPATIENT
Start: 2018-01-11 | End: 2018-01-11

## 2018-01-11 RX ORDER — HYDROCODONE BITARTRATE AND ACETAMINOPHEN 5; 325 MG/1; MG/1
1 TABLET ORAL EVERY 6 HOURS PRN
Status: DISCONTINUED | OUTPATIENT
Start: 2018-01-11 | End: 2018-01-12

## 2018-01-11 RX ORDER — ACETAMINOPHEN 325 MG/1
650 TABLET ORAL EVERY 6 HOURS PRN
Status: DISCONTINUED | OUTPATIENT
Start: 2018-01-11 | End: 2018-01-13 | Stop reason: HOSPADM

## 2018-01-11 RX ORDER — NITROFURANTOIN 25; 75 MG/1; MG/1
100 CAPSULE ORAL NIGHTLY
Status: DISCONTINUED | OUTPATIENT
Start: 2018-01-11 | End: 2018-01-11 | Stop reason: SDUPTHER

## 2018-01-11 RX ORDER — CARBOPROST TROMETHAMINE 250 UG/ML
INJECTION, SOLUTION INTRAMUSCULAR
Status: DISPENSED
Start: 2018-01-11 | End: 2018-01-11

## 2018-01-11 RX ORDER — NITROFURANTOIN 25; 75 MG/1; MG/1
100 CAPSULE ORAL NIGHTLY
Status: DISCONTINUED | OUTPATIENT
Start: 2018-01-11 | End: 2018-01-13 | Stop reason: HOSPADM

## 2018-01-11 RX ORDER — ONDANSETRON 8 MG/1
8 TABLET, ORALLY DISINTEGRATING ORAL EVERY 8 HOURS PRN
Status: DISCONTINUED | OUTPATIENT
Start: 2018-01-11 | End: 2018-01-13 | Stop reason: HOSPADM

## 2018-01-11 RX ORDER — METHYLERGONOVINE MALEATE 0.2 MG/ML
INJECTION INTRAVENOUS
Status: DISPENSED
Start: 2018-01-11 | End: 2018-01-11

## 2018-01-11 RX ORDER — DIPHENHYDRAMINE HCL 25 MG
25 CAPSULE ORAL EVERY 4 HOURS PRN
Status: DISCONTINUED | OUTPATIENT
Start: 2018-01-11 | End: 2018-01-13 | Stop reason: HOSPADM

## 2018-01-11 RX ADMIN — Medication 41.65 MILLI-UNITS/MIN: at 07:01

## 2018-01-11 RX ADMIN — HYDROCODONE BITARTRATE AND ACETAMINOPHEN 1 TABLET: 10; 325 TABLET ORAL at 11:01

## 2018-01-11 RX ADMIN — Medication 2 MILLI-UNITS/MIN: at 03:01

## 2018-01-11 RX ADMIN — CALCIUM CARBONATE 1000 MG: 500 TABLET, CHEWABLE ORAL at 03:01

## 2018-01-11 RX ADMIN — ACETAMINOPHEN 650 MG: 325 TABLET ORAL at 03:01

## 2018-01-11 RX ADMIN — SODIUM CHLORIDE, SODIUM LACTATE, POTASSIUM CHLORIDE, AND CALCIUM CHLORIDE 1000 ML: 600; 310; 30; 20 INJECTION, SOLUTION INTRAVENOUS at 12:01

## 2018-01-11 RX ADMIN — NALBUPHINE HYDROCHLORIDE 10 MG: 10 INJECTION, SOLUTION INTRAMUSCULAR; INTRAVENOUS; SUBCUTANEOUS at 12:01

## 2018-01-11 RX ADMIN — ACETAMINOPHEN 650 MG: 325 TABLET ORAL at 09:01

## 2018-01-11 RX ADMIN — HYDROCODONE BITARTRATE AND ACETAMINOPHEN 1 TABLET: 10; 325 TABLET ORAL at 05:01

## 2018-01-11 RX ADMIN — NITROFURANTOIN (MONOHYDRATE/MACROCRYSTALS) 100 MG: 75; 25 CAPSULE ORAL at 09:01

## 2018-01-11 NOTE — SUBJECTIVE & OBJECTIVE
"    Obstetric History       T1      L1     SAB1   TAB0   Ectopic0   Multiple0   Live Births1       # Outcome Date GA Lbr Darrell/2nd Weight Sex Delivery Anes PTL Lv   3 Current            2 SAB            1 Term      Vag-Spont   ALEXSANDER        Past Medical History:   Diagnosis Date    Anxiety     Asthma     Depression     Diet controlled gestational diabetes mellitus (GDM) in third trimester 2017    History of diverticulitis     IBS (irritable bowel syndrome)      Past Surgical History:   Procedure Laterality Date    CERVIX SURGERY      removal of tumor    OVARIAN CYST REMOVAL           (Not in a hospital admission)    Review of patient's allergies indicates:   Allergen Reactions    Keflex [cephalexin] Swelling and Rash    Percocet [oxycodone-acetaminophen] Other (See Comments)     nausea, abdominal cramping - "just doesn't like taking it" - patient took morning of 2016          Family History     Problem Relation (Age of Onset)    Diabetes Paternal Grandfather, Paternal Grandmother, Maternal Grandmother, Maternal Grandfather, Father, Mother, Paternal Aunt, Paternal Uncle    Hypertension Paternal Grandfather, Paternal Grandmother, Maternal Grandmother, Maternal Grandfather, Father, Mother    Ovarian cancer Mother        Social History Main Topics    Smoking status: Current Every Day Smoker     Packs/day: 0.50     Years: 14.00     Types: Cigarettes    Smokeless tobacco: Never Used    Alcohol use No    Drug use: No    Sexual activity: Not Currently     Birth control/ protection: Rhythm     Review of Systems   Constitutional: Negative for activity change, chills and fatigue.   Eyes: Negative for visual disturbance.   Respiratory: Negative for shortness of breath.    Cardiovascular: Negative for chest pain and palpitations.   Gastrointestinal: Negative for abdominal pain, constipation, diarrhea, nausea and vomiting.   Genitourinary: Negative for dysuria, vaginal bleeding, vaginal " discharge, vaginal pain and vaginal odor.   Musculoskeletal: Negative for myalgias.   Skin:  Negative for rash.   Neurological: Negative for headaches.   Psychiatric/Behavioral: Negative for depression.      Objective:     Vital Signs (Most Recent):  Temp: 97.9 °F (36.6 °C) (01/10/18 1939)  Pulse: (!) 111 (01/10/18 1939)  Resp: 16 (01/10/18 1939)  BP: 116/68 (01/10/18 1939)  SpO2: 97 % (01/10/18 1939) Vital Signs (24h Range):  Temp:  [97.9 °F (36.6 °C)] 97.9 °F (36.6 °C)  Pulse:  [111] 111  Resp:  [16] 16  SpO2:  [97 %] 97 %  BP: (116)/(68) 116/68     Weight: 89.8 kg (198 lb)  Body mass index is 30.11 kg/m².    FHT: Cat 1 (reassuring), 120 bpm, moderate BTBV, + accels, - decels  TOCO: Q 2-4 minutes    Physical Exam:   Constitutional: She is oriented to person, place, and time. She appears well-developed and well-nourished.    HENT:   Head: Normocephalic and atraumatic.    Eyes: EOM are normal. Pupils are equal, round, and reactive to light.    Neck: Normal range of motion. Neck supple.    Cardiovascular: Normal rate, regular rhythm and normal heart sounds.     Pulmonary/Chest: Effort normal and breath sounds normal. No respiratory distress.        Abdominal: Soft. Bowel sounds are normal. She exhibits no distension. There is no tenderness. There is no rebound and no guarding.   Gravid, size appropriate for dates      Genitourinary:   Genitourinary Comments: 4/70/-3, intact           Musculoskeletal: Normal range of motion.       Neurological: She is alert and oriented to person, place, and time.    Skin: Skin is warm and dry. No rash noted.    Psychiatric: She has a normal mood and affect. Her behavior is normal. Judgment and thought content normal.     Cervix:  Dilation:  4  Effacement:  75%  Station: -3  Presentation: Vertex     Significant Labs:  Lab Results   Component Value Date    GROUPTRH O POS 11/13/2017    HEPBSAG NR 02/02/2016    STREPBCULT No Group B Streptococcus isolated 12/20/2017     I have  personallly reviewed all pertinent lab results from the last 24 hours.

## 2018-01-11 NOTE — ASSESSMENT & PLAN NOTE
- Admit to Labor and Delivery unit  - Consents for delivery including vaginal or  section and blood transfusion signed and to chart  - Risks, benefits, alternatives and possible complications have been discussed in detail with the patient.   - Epidural per Anesthesia  - Draw CBC, T&S  - Notify Staff  - Recheck in 2 hrs or PRN    Post-Partum Hemorrhage risk - low

## 2018-01-11 NOTE — HPI
Alena Willis is a 32 y.o. [id number] at 38w1d admitted from [facility] for labor at term.   This IUP is complicated by gMD (diet), asthma, history of anxiety/depression (no meds). She has also been taking antibiotics for a persistent E coli UTI this pregnancy.  Patient reports contractions, denies vaginal bleeding, denies LOF.   Fetal Movement: normal .      Blood Type O POS   GBBS: negative  Rubella: Immune  RPR: NR  HIV: negative  HepB: negative    Surgical history: ovarian cyst removal and cervical tumor removal     Allergic to Kflex and Percocet

## 2018-01-11 NOTE — DISCHARGE INSTRUCTIONS
Breastfeeding Discharge Instructions       Feed the baby at the earliest sign of hunger or comfort  o Hands to mouth, sucking motions  o Rooting or searching for something to suck on  o Dont wait for crying - it is a late sign of hunger and comfort.     The feedings may be 8-12 times per 24hrs and will not follow a schedule   Avoid pacifiers and bottles for the first 4 weeks   Alternate the breast you start the feeding with, or start with the breast that feels the fullest   Switch breasts when the baby takes himself off the breast or falls asleep   Keep offering breasts until the baby looks full, no longer gives hunger signs, and stays asleep when placed on his back in the crib   If the baby is sleepy and wont wake for a feeding, put the baby skin-to-skin dressed in a diaper against the mothers bare chest   Sleep near your baby   The baby should be positioned and latched on to the breast correctly  o Chest-to-chest, chin in the breast  o Babys lips are flipped outward  o Babys mouth is stretched open wide like a shout  o Babys sucking should feel like tugging to the mother  - The baby should be drinking at the breast:  o You should hear swallowing or gulping throughout the feeding  o You should see milk on the babys lips when he comes off the breast  o Your breasts should be softer when the baby is finished feeding  o The baby should look relaxed at the end of feedings  o After the 4th day and your milk is in:  o The babys poop should turn bright yellow and be loose, watery, and seedy  o The baby should have at least 3-4 poops the size of the palm of your hand per day  o The baby should have at least 6-8 wet diapers per day  o The urine should be light yellow in color  You should drink when you are thirsty and eat a healthy diet when you are    hungry.     Take naps to get the rest you need.   Take medications and/or drink alcohol only with permission of your obstetrician    or the babys  pediatrician.  You can also call the [facility],   ([phone]), Monday-Friday, 8am-5pm Central time, to get the most   up-to-date evidence-based information on the use of medications during   pregnancy and breastfeeding.      The baby should be examined by a pediatrician at 3-5 days of age.  Once your   milk comes in, the baby should be gaining at least ½ - 1oz each day and should be back to birthweight no later than 10-14 days of age.          Community Resources    [facility] Breastfeeding Warmline:  [phone]  Local [facility] clinics: provide incentives and breastpumps to eligible mothers  La Leche League International (LLLI):  mother-to-mother support group website        www.[employer]Mobilize  Local La Leche League mother-to-mother support groups:        www.[employer].Notch        La Leche League [facility]         www.violetta@Grata.com  Dr. Scott Evans website for latch videos and general information:        www.breastfeedinginc.ca  [facility] is a call center that provides information about the safety of taking medications while breastfeeding.  Call 0-237-979-7342, M-F, 8am-5pm, [place].  International Lactation Consultant Association provides resources for assistance:        www.ilca.org  [place] Breastfeeding Coalition provides informationand resources for parents  and the community    http://[facility] Centerbreastfeeding.org     [employer] mom provides resources for assistance:        www.nolamom.org  [employer] for Healthy Babies:  6-218-946-BABY(5668)  [facility] provides a list of breastfeeding services by zip code:        www.[facility] Care CenterATG Media ([employer]).AdMobilize  [employer] Lait:  [phone] a breastfeeding support group for women of color

## 2018-01-11 NOTE — ED TRIAGE NOTES
Pt presents to [facility] with complaints of sharp abdominal pain. Pt states pain started around 4pm today. Unsure whether having contractions. Pt states pain comes and goes every couple minutes. Pt placed in [facility]. MD notified of patient arrival. WCGEOFF.

## 2018-01-11 NOTE — PROGRESS NOTES
"LABOR NOTE - Late entry    S:  Comfortable with epidural    O: BP (!) 104/59   Pulse 87   Temp 97.5 °F (36.4 °C)   Resp 16   Ht 5' 8" (1.727 m)   Wt 89.8 kg (198 lb)   LMP 2017   SpO2 96%   BMI 30.11 kg/m²       FHT: 120/+acels/no decels/mod BTBV Cat 1 (reassuring)  CTX: q 5 minutes  SVE: 4/80/-2, s/p AROM clr      ASSESSMENT:   32 y.o.  at 38w2d, labor    FHT reassuring    Active Hospital Problems    Diagnosis  POA    Diet controlled gestational diabetes mellitus (GDM) in third trimester [O24.410]  Yes    Maternal asthma complicating pregnancy [O99.519, J45.909]  Yes    Pregnancy, supervision, normal, first/breast/vasectomy, flu [Z34.00]  Not Applicable    Anxiety and depression [F41.8]  Yes      Resolved Hospital Problems    Diagnosis Date Resolved POA   No resolved problems to display.         PLAN:    Continue Close Maternal/Fetal Monitoring  Will start pitocin per protocol  Recheck 2 hours or PRN    "

## 2018-01-11 NOTE — PROGRESS NOTES
"LABOR NOTE - Late entry    S:  Comfortable with epidural    O: /62   Pulse 97   Temp 97.5 °F (36.4 °C)   Resp 16   Ht 5' 8" (1.727 m)   Wt 89.8 kg (198 lb)   LMP 2017   SpO2 96%   BMI 30.11 kg/m²       FHT: 130/+acels/no decels/mod BTBV Cat 1 (reassuring)  CTX: q 3-4 minutes  SVE: 4/80/-2, AROM clr      ASSESSMENT:   32 y.o.  at 38w2d, labor    FHT reassuring    Active Hospital Problems    Diagnosis  POA    Diet controlled gestational diabetes mellitus (GDM) in third trimester [O24.410]  Yes    Maternal asthma complicating pregnancy [O99.519, J45.909]  Yes    Pregnancy, supervision, normal, first/breast/vasectomy, flu [Z34.00]  Not Applicable    Anxiety and depression [F41.8]  Yes      Resolved Hospital Problems    Diagnosis Date Resolved POA   No resolved problems to display.         PLAN:    Continue Close Maternal/Fetal Monitoring  Recheck 2 hours or PRN    "

## 2018-01-11 NOTE — L&D DELIVERY NOTE
Delivery Summary   cephalic after approximately 10 minutes of maternal pushing, under epidural anesthesia. Infant delivered OA over intact perineum. Infant tolerated the delivery well and was placed on mothers abdomen for skin to skin and bulb suctioning performed. Cord clamped and cut after 1 minute delay. Umbilical venous blood obtained. Placenta delivered spontaneously and IV pitocin given. Uterine tone noted. No cervical lacerations. 2nd degree perineal laceration repaired with 2-0 vicryl in the usual fashion. Bilateral periurethral tears repaired with running sutures of 3-0 vicryl. Patient tolerated the delivery well.  EBL 300cc  Staff present for entire procedure.  S/L/N counts correct x2.     Delivery Information for  Niharika Willis    Birth information:  YOB: 2018   Time of birth: 7:12 AM   Sex: female   Head Delivery Date/Time: 2018  7:12 AM   Delivery type: Vaginal, Spontaneous Delivery   Gestational Age: 38w2d    Delivery Providers    Delivering clinician:  Maria Guadalupe Holden,    Other personnel:   Provider Role   MD Chang Ruiz MD Paige Nelson, RN Megan W. Seay, RN Angelle L. Campbell, RN Maria Swanner, RN                 Measurements    Weight:  3420 g Length:  50.8 cm   Head circum.:  33.7 cm Chest circum.:  35.6 cm           Assessment    Living status:  Living  Apgars:     1 Minute:   5 Minute:   10 Minute:   15 Minute:   20 Minute:     Skin Color:   0  1       Heart Rate:   2  2       Reflex Irritability:   2  2       Muscle Tone:   2  2       Respiratory Effort:   2  2       Total:   8  9               Apgars Assigned By:  MYLA CHUA RN         Assisted Delivery Details:    Forceps attempted?:  No  Vacuum extractor attempted?:  No         Shoulder Dystocia    Shoulder dystocia present?:  No           Presentation and Position    Presentation:   Vertex   Position:   Right    Occiput    Anterior             Interventions/Resuscitation    Method:  Bulb Suctioning, Tactile Stimulation       Cord    Vessels:  3 vessels  Complications:  None  Cord Blood Disposition:  Sent with Baby  Gases Sent?:  No       Placenta    Date and time:  2018  7:15 AM  Removal:  Spontaneous  Appearance:  Intact  Placenta disposition:  discarded           Labor Events:       labor: No     Labor Onset Date/Time:         Dilation Complete Date/Time:         Start Pushing Date/Time:       Rupture Date/Time:              Rupture type:           Fluid Amount:        Fluid Color:        Fluid Odor:        Membrane Status (PeriCalm): ARM (Artificial Rupture)      Rupture Date/Time (PeriCalm): 2018 00:15:00      Fluid Amount (PeriCalm): Large      Fluid Color (PeriCalm): Bloody       steroids: None     Antibiotics given for GBS: No     Induction:       Indications for induction:        Augmentation:       Indications for augmentation:       Labor complications: None     Additional complications:          Cervical ripening:                     Delivery:      Episiotomy: None     Indication for Episiotomy:       Perineal Lacerations: 2nd Repaired:  Yes   Periurethral Laceration: bilateral Repaired: Yes   Labial Laceration:   Repaired:     Sulcus Laceration:   Repaired:     Vaginal Laceration:   Repaired:     Cervical Laceration:   Repaired:     Repair suture:       Repair # of packets:       Vaginal delivery QBL (mL): 300      QBL (mL): 0     Combined Blood Loss (mL): 300     Vaginal Sweep Performed:       S[facility]icount Correct:         Other providers:       Anesthesia    Method:  Epidural          Details (if applicable):  Trial of Labor      Categorization:      Priority:     Indications for :     Incision Type:       Additional  information:  Forceps:    Vacuum:    Breech:    Observed anomalies    Other (Comments):         Chang Mariscal MD  PGY1, OBGYN [facility]

## 2018-01-11 NOTE — ED PROVIDER NOTES
"Encounter Date: 1/10/2018       History     Chief Complaint   Patient presents with    Abdominal Pain    Abdominal Cramping     Alena Willis is a 32 y.o. [id number] at 38w1d presents complaining of contractions/N/V.   This IUP is complicated by GDM (diet controlled), polyhydramnios, anxiety/depression, recurrent UTI, asthma.  Patient reports contractions, denies vaginal bleeding, denies LOF.   Fetal Movement: normal. U/S at 37w: Overall, interval fetal growth wnl (EFW = 3418 gms at 64%): (AC 93%).            Review of patient's allergies indicates:   Allergen Reactions    Keflex [cephalexin] Swelling and Rash    Percocet [oxycodone-acetaminophen] Other (See Comments)     nausea, abdominal cramping - "just doesn't like taking it" - patient took morning of 2016       Past Medical History:   Diagnosis Date    Anxiety     Asthma     Depression     Diet controlled gestational diabetes mellitus (GDM) in third trimester 2017    History of diverticulitis     IBS (irritable bowel syndrome)      Past Surgical History:   Procedure Laterality Date    CERVIX SURGERY      removal of tumor    OVARIAN CYST REMOVAL       Family History   Problem Relation Age of Onset    Diabetes Paternal Grandfather     Hypertension Paternal Grandfather     Diabetes Paternal Grandmother     Hypertension Paternal Grandmother     Diabetes Maternal Grandmother     Hypertension Maternal Grandmother     Diabetes Maternal Grandfather     Hypertension Maternal Grandfather     Diabetes Father     Hypertension Father     Diabetes Mother     Hypertension Mother     Ovarian cancer Mother      Had hysterectomy at age 37    Diabetes Paternal Aunt     Diabetes Paternal Uncle     Breast cancer Neg Hx     Colon cancer Neg Hx      Social History   Substance Use Topics    Smoking status: Current Every Day Smoker     Packs/day: 0.50     Years: 14.00     Types: Cigarettes    Smokeless tobacco: Never Used    Alcohol use No "     Review of Systems   Constitutional: Negative for chills, fatigue and fever.   HENT: Negative for congestion.    Eyes: Negative for visual disturbance.   Respiratory: Negative for cough and shortness of breath.    Cardiovascular: Negative for chest pain and palpitations.   Gastrointestinal: Positive for abdominal pain (contractions), nausea and vomiting. Negative for abdominal distention, constipation and diarrhea.   Genitourinary: Negative for difficulty urinating, dysuria, hematuria, vaginal bleeding and vaginal discharge.   Skin: Negative for rash.   Neurological: Negative for dizziness, seizures, light-headedness and headaches.   Hematological: Does not bruise/bleed easily.   Psychiatric/Behavioral: Negative for dysphoric mood. The patient is not nervous/anxious.        Physical Exam     Initial Vitals [01/10/18 1939]   BP Pulse Resp Temp SpO2   116/68 (!) 111 16 97.9 °F (36.6 °C) 97 %      MAP       84         Physical Exam    Nursing note and vitals reviewed.  Constitutional: She appears well-developed and well-nourished. She is not diaphoretic. No distress.   HENT:   Head: Normocephalic and atraumatic.   Eyes: Conjunctivae and EOM are normal.   Neck: Normal range of motion.   Cardiovascular: Normal rate.   Pulmonary/Chest: No respiratory distress.   Abdominal: She exhibits no distension. There is tenderness (with contractions).   Gravid uterus   Musculoskeletal: Normal range of motion.   Neurological: She is alert and oriented to person, place, and time.   Skin: Skin is warm and dry.   Psychiatric: She has a normal mood and affect.     OB LABOR EXAM:       Method: Sterile vaginal exam per MD.   Vaginal Bleeding: none present.     Dilatation: 4.   Station: -3.   Effacement: 70%.   Amniotic Fluid Color: no fluid.     Comments: NST Interpretation:   170 BPM baseline initially x 2hours, resolved to 130 baseline  Variability: moderate  Accelerations: absent  Decelerations: occasional variables   Contractions:  q2-3 minutes    Clinical Impression: Reactive Non-Stress Test after prolonged moitoring         ED Course   Obtain Fetal nonstress test (NST)  Date/Time: 1/10/2018 11:10 PM  Performed by: YAS DUQUE  Authorized by: YAS DUQUE     Nonstress Test:     Variability:  6-25 BPM    Decelerations:  Variable    Accelerations:  15 bpm    Acoustic Stimulator: No      Baseline:  130    Contractions:  Regular    Contraction Frequency:  2-3      Labs Reviewed   POCT GLUCOSE - Abnormal; Notable for the following:        Result Value    POCT Glucose 133 (*)     All other components within normal limits   PROTEIN / CREATININE RATIO, URINE   POCT GLUCOSE MONITORING CONTINUOUS   POCT GLUCOSE MONITORING CONTINUOUS                          Attending Attestation:   Physician Attestation Statement for Resident:  As the supervising MD   Physician Attestation Statement: I have personally seen and examined this patient.   I agree with the above history. -:   As the supervising MD I agree with the above PE.    As the supervising MD I agree with the above treatment, course, plan, and disposition.   -:   NST  I independently reviewed the fetal non-stress test with the following interpretation:  135 BPM baseline following a period of fetal tachycardia at 170  Variability: moderate  Accelerations: present   Decelerations: absent  Contractions: Q 2-3 min  Category 2    Clinical Interpretation:reactive    Patient evaluated and found to be stable, agree with resident's assessment of active labor in setting of gestational diabetes and polyhydramnios/macrosomia and plan to admit to L+D.  I was personally present during the critical portions of the procedure(s) performed by the resident and was immediately available in the ED to provide services and assistance as needed during the entire procedure.  I have reviewed and agree with the residents interpretation of the following: lab data.  I have reviewed the following: old records at this  facility.                    ED Course      Clinical Impression:   The primary encounter diagnosis was Indication for care in labor or delivery. Diagnoses of Fetal tachycardia affecting management of mother, Anxiety, Asthma, unspecified asthma severity, unspecified whether complicated, unspecified whether persistent, and Depression during pregnancy, antepartum were also pertinent to this visit.    Disposition:   Disposition: Discharged  Condition: Stable  Pt admitted to L&D for labor management as cervix changed from 3-4cm with prolonged fetal tachycardia in a patient with GDM/Polyhydramnios.                         Gemma Gonsalez MD  Resident  01/10/18 1399       Waleska Sol MD  01/11/18 0044

## 2018-01-11 NOTE — HOSPITAL COURSE
01/10/2018 Admit to L&D for labor at term, augmentation PRN  2018 -  with no complications.  cc.  2018 - PPD#1, patient is meeting postpartum goals (ambulating, voiding spontaneously, tolerating regular diet). Has not passed flatus or had Bm. Hemorrhoids are very painful.  2018 - PPD#2. Pt doing well. Meeting all postoperative milestones. Anticipate discharge today after staff rounds.

## 2018-01-11 NOTE — NURSING
Discussed the risks of the introduction of an artificial nipple With parents.  Encouraged to put the baby to the breast when feeding cues are present.  Discussed the AAP recommendation of no bottles or pacifiers until at least 1 month of age.  States understanding verbalized appropriate recall.  Pt states that her intention is to offer an artificial nipple at this time an always as needed.  Request honored.  Instructed that she must provide her own pacifier.

## 2018-01-11 NOTE — PROGRESS NOTES
"LABOR NOTE - Late entry    S:  Comfortable with epidural.    O: BP (!) 104/59   Pulse 87   Temp 97.5 °F (36.4 °C)   Resp 16   Ht 5' 8" (1.727 m)   Wt 89.8 kg (198 lb)   LMP 2017   SpO2 96%   BMI 30.11 kg/m²       FHT: 120/+acels/no decels/mod BTBV Cat 1 (reassuring)  CTX: q 2 minutes  SVE: 5/80/-2, s/p AROM clr      ASSESSMENT:   32 y.o.  at 38w2d, labor    FHT reassuring    Active Hospital Problems    Diagnosis  POA    Diet controlled gestational diabetes mellitus (GDM) in third trimester [O24.410]  Yes    Maternal asthma complicating pregnancy [O99.519, J45.909]  Yes    Pregnancy, supervision, normal, first/breast/vasectomy, flu [Z34.00]  Not Applicable    Anxiety and depression [F41.8]  Yes      Resolved Hospital Problems    Diagnosis Date Resolved POA   No resolved problems to display.         PLAN:    Continue Close Maternal/Fetal Monitoring  Will continue pitocin per protocol  Recheck 2 hours or PRN    "

## 2018-01-11 NOTE — ANESTHESIA PREPROCEDURE EVALUATION
01/10/2018  Alena Willis is a 32 y.o. female  at 38w4d pmhx of recurrent UTIs, asthma, gDM (diet-controlled) and anxiety and depression who was admitted  In November for pyelonephritis. Presented today in active labor.    OB History    Para Term  AB Living   3 1 1   1 1   SAB TAB Ectopic Multiple Live Births   1       1      # Outcome Date GA Lbr Darrell/2nd Weight Sex Delivery Anes PTL Lv   3 Current            2 SAB            1 Term      Vag-Spont   ALEXSANDER          Wt Readings from Last 1 Encounters:   01/10/18 1925 89.8 kg (198 lb)       BP Readings from Last 3 Encounters:   01/10/18 116/68   18 122/70   17 110/70       Patient Active Problem List   Diagnosis    Anxiety and depression    Pregnancy, supervision, normal, first/breast/vasectomy, flu    Maternal asthma complicating pregnancy    Mental disorder affecting pregnancy    Pyelonephritis    Diet controlled gestational diabetes mellitus (GDM) in third trimester    Neck pain    Shoulder pain, bilateral    Anxiety    Asthma    Depression       Past Surgical History:   Procedure Laterality Date    CERVIX SURGERY      removal of tumor    OVARIAN CYST REMOVAL         Social History     Social History    Marital status: Single     Spouse name: N/A    Number of children: N/A    Years of education: N/A     Occupational History    Not on file.     Social History Main Topics    Smoking status: Current Every Day Smoker     Packs/day: 0.50     Years: 14.00     Types: Cigarettes    Smokeless tobacco: Never Used    Alcohol use No    Drug use: No    Sexual activity: Not Currently     Birth control/ protection: Rhythm     Other Topics Concern    Not on file     Social History Narrative    No narrative on file         Chemistry        Component Value Date/Time     (L) 11/15/2017 0631    K 3.5 11/15/2017 0631      11/15/2017 0631    CO2 19 (L) 11/15/2017 0631    BUN 5 (L) 11/15/2017 0631    CREATININE 0.5 11/15/2017 0631    GLU 84 11/15/2017 0631        Component Value Date/Time    CALCIUM 8.4 (L) 11/15/2017 0631    ALKPHOS 85 11/15/2017 0631    AST 9 (L) 11/15/2017 0631    ALT 8 (L) 11/15/2017 0631    BILITOT 0.2 11/15/2017 0631    ESTGFRAFRICA >60 11/15/2017 0631    EGFRNONAA >60 11/15/2017 0631            Lab Results   Component Value Date    WBC 12.55 12/26/2017    HGB 11.2 (L) 12/26/2017    HCT 34.6 (L) 12/26/2017    MCV 91 12/26/2017     12/26/2017       No results for input(s): PT, INR, PROTIME, APTT in the last 72 hours.                  Anesthesia Evaluation    I have reviewed the Patient Summary Reports.     I have reviewed the Medications.     Review of Systems  Social:  Smoker    Cardiovascular:  Cardiovascular Normal     Pulmonary:   Asthma    Hepatic/GI:  Hepatic/GI Normal    Neurological:  Neurology Normal    Endocrine:   Diabetes, well controlled    Psych:   anxiety depression          Physical Exam  General:  Well nourished    Airway/Jaw/Neck:  Airway Findings: Mouth Opening: Normal Tongue: Normal  General Airway Assessment: Adult  Mallampati: III  Improves to II with phonation.  TM Distance: Normal, at least 6 cm      Dental:  Dental Findings: In tact   Chest/Lungs:  Chest/Lungs Findings: Clear to auscultation, Normal Respiratory Rate     Heart/Vascular:  Heart Findings: Rate: Normal  Rhythm: Regular Rhythm  Sounds: Normal             Anesthesia Plan  Type of Anesthesia, risks & benefits discussed:  Anesthesia Type:  CSE, epidural, general, spinal  Patient's Preference:   Intra-op Monitoring Plan: standard ASA monitors  Intra-op Monitoring Plan Comments:   Post Op Pain Control Plan:   Post Op Pain Control Plan Comments:   Induction:   IV  Beta Blocker:  Patient is not currently on a Beta-Blocker (No further documentation required).       Informed Consent: Patient understands risks and agrees  with Anesthesia plan.  Questions answered. Anesthesia consent signed with patient.  ASA Score: 2     Day of Surgery Review of History & Physical:    H&P update referred to the provider.         Ready For Surgery From Anesthesia Perspective.

## 2018-01-11 NOTE — ANESTHESIA PROCEDURE NOTES
Epidural    Patient location during procedure: OB   Reason for block: primary anesthetic   Diagnosis: active labor    Start time: 1/10/2018 11:00 PM  Timeout: 1/10/2018 10:58 PM  End time: 1/10/2018 11:08 PM  Staffing  Anesthesiologist: DERIAN MAY  Resident/CRNA: JAIME ELIZALDE  Performed: resident/CRNA   Preanesthetic Checklist  Completed: patient identified, site marked, surgical consent, pre-op evaluation, timeout performed, IV checked, risks and benefits discussed, monitors and equipment checked, anesthesia consent given, hand hygiene performed and patient being monitored  Preparation  Patient position: sitting  Prep: ChloraPrep  Patient monitoring: Pulse Ox and Blood Pressure  Epidural  Skin Anesthetic: lidocaine 1%  Skin Wheal: 3 mL  Administration type: single shot  Approach: midline  Interspace: L4-5  Injection technique: RENA saline  Needle and Epidural Catheter  Needle type: Tuohy   Needle gauge: 17  Needle length: 3.5 inches  Needle insertion depth: 5 cm  Catheter type: springwound  Catheter size: 19 G  Catheter at skin depth: 9 cm  Test dose: 3 mL of lidocaine 1.5% with Epi 1-to-200,000  Additional Documentation: incremental injection, negative aspiration for heme and CSF and no paresthesia on injection  Needle localization: anatomical landmarks  Medications:  Bolus administered: 10 mL of 0.125% bupivacaine  Opioid administered: 100 mcg of   fentanyl  Assessment  Ease of block: easy  Patient's tolerance of the procedure: comfortable throughout block  Post dural Puncture Headache?: No

## 2018-01-11 NOTE — PROGRESS NOTES
"LABOR NOTE    S:  Pt reporting increased pressure  O: BP (!) 99/56   Pulse 78   Temp 97.5 °F (36.4 °C)   Resp 16   Ht 5' 8" (1.727 m)   Wt 89.8 kg (198 lb)   LMP 2017   SpO2 95%   BMI 30.11 kg/m²     FHT: 135, moderate, + accels, - decels  Cat 1 (reassuring)  CTX: q 2-3 minutes  SVE: complete, +1    ASSESSMENT:   32 y.o.  at 38w2d, normal labor    FHT reassuring    Active Hospital Problems    Diagnosis  POA    Diet controlled gestational diabetes mellitus (GDM) in third trimester [O24.410]  Yes    Maternal asthma complicating pregnancy [O99.519, J45.909]  Yes    Pregnancy, supervision, normal, first/breast/vasectomy, flu [Z34.00]  Not Applicable    Anxiety and depression [F41.8]  Yes      Resolved Hospital Problems    Diagnosis Date Resolved POA   No resolved problems to display.     PLAN:  Will set up to push    Chang Mariscal MD  PGY1, OBGYN [facility]    "

## 2018-01-12 PROBLEM — Z34.00 PREGNANCY, SUPERVISION, NORMAL, FIRST: Status: RESOLVED | Noted: 2017-08-02 | Resolved: 2018-01-12

## 2018-01-12 LAB
BASOPHILS # BLD AUTO: 0.01 K/UL
BASOPHILS # BLD AUTO: 0.01 K/UL
BASOPHILS NFR BLD: 0.1 %
BASOPHILS NFR BLD: 0.1 %
DIFFERENTIAL METHOD: ABNORMAL
DIFFERENTIAL METHOD: ABNORMAL
EOSINOPHIL # BLD AUTO: 0.1 K/UL
EOSINOPHIL # BLD AUTO: 0.1 K/UL
EOSINOPHIL NFR BLD: 1.1 %
EOSINOPHIL NFR BLD: 1.1 %
ERYTHROCYTE [DISTWIDTH] IN BLOOD BY AUTOMATED COUNT: 14.8 %
ERYTHROCYTE [DISTWIDTH] IN BLOOD BY AUTOMATED COUNT: 14.8 %
GLUCOSE SERPL-MCNC: 91 MG/DL
HCT VFR BLD AUTO: 31.1 %
HCT VFR BLD AUTO: 31.1 %
HGB BLD-MCNC: 10.1 G/DL
HGB BLD-MCNC: 10.1 G/DL
LYMPHOCYTES # BLD AUTO: 1.2 K/UL
LYMPHOCYTES # BLD AUTO: 1.2 K/UL
LYMPHOCYTES NFR BLD: 10.3 %
LYMPHOCYTES NFR BLD: 10.3 %
MCH RBC QN AUTO: 29.3 PG
MCH RBC QN AUTO: 29.3 PG
MCHC RBC AUTO-ENTMCNC: 32.5 G/DL
MCHC RBC AUTO-ENTMCNC: 32.5 G/DL
MCV RBC AUTO: 90 FL
MCV RBC AUTO: 90 FL
MONOCYTES # BLD AUTO: 1.1 K/UL
MONOCYTES # BLD AUTO: 1.1 K/UL
MONOCYTES NFR BLD: 9.4 %
MONOCYTES NFR BLD: 9.4 %
NEUTROPHILS # BLD AUTO: 9 K/UL
NEUTROPHILS # BLD AUTO: 9 K/UL
NEUTROPHILS NFR BLD: 78.7 %
NEUTROPHILS NFR BLD: 78.7 %
PLATELET # BLD AUTO: 201 K/UL
PLATELET # BLD AUTO: 201 K/UL
PMV BLD AUTO: 11.2 FL
PMV BLD AUTO: 11.2 FL
RBC # BLD AUTO: 3.45 M/UL
RBC # BLD AUTO: 3.45 M/UL
WBC # BLD AUTO: 11.41 K/UL
WBC # BLD AUTO: 11.41 K/UL

## 2018-01-12 PROCEDURE — 25000003 PHARM REV CODE 250: Performed by: STUDENT IN AN ORGANIZED HEALTH CARE EDUCATION/TRAINING PROGRAM

## 2018-01-12 PROCEDURE — 82947 ASSAY GLUCOSE BLOOD QUANT: CPT

## 2018-01-12 PROCEDURE — 36415 COLL VENOUS BLD VENIPUNCTURE: CPT

## 2018-01-12 PROCEDURE — 11000001 HC ACUTE MED/SURG PRIVATE ROOM

## 2018-01-12 PROCEDURE — 63600175 PHARM REV CODE 636 W HCPCS: Performed by: OBSTETRICS & GYNECOLOGY

## 2018-01-12 PROCEDURE — 99232 SBSQ HOSP IP/OBS MODERATE 35: CPT | Mod: ,,, | Performed by: OBSTETRICS & GYNECOLOGY

## 2018-01-12 PROCEDURE — 85025 COMPLETE CBC W/AUTO DIFF WBC: CPT

## 2018-01-12 RX ORDER — HYDROCODONE BITARTRATE AND ACETAMINOPHEN 10; 325 MG/1; MG/1
1 TABLET ORAL EVERY 4 HOURS PRN
Status: DISCONTINUED | OUTPATIENT
Start: 2018-01-12 | End: 2018-01-13 | Stop reason: HOSPADM

## 2018-01-12 RX ORDER — HYDROCORTISONE 25 MG/G
CREAM TOPICAL
Status: DISCONTINUED | OUTPATIENT
Start: 2018-01-12 | End: 2018-01-13 | Stop reason: HOSPADM

## 2018-01-12 RX ORDER — HYDROCORTISONE 25 MG/G
CREAM TOPICAL 2 TIMES DAILY
Status: DISCONTINUED | OUTPATIENT
Start: 2018-01-12 | End: 2018-01-12

## 2018-01-12 RX ORDER — KETOROLAC TROMETHAMINE 30 MG/ML
15 INJECTION, SOLUTION INTRAMUSCULAR; INTRAVENOUS EVERY 6 HOURS PRN
Status: DISPENSED | OUTPATIENT
Start: 2018-01-12 | End: 2018-01-13

## 2018-01-12 RX ORDER — HYDROCODONE BITARTRATE AND ACETAMINOPHEN 5; 325 MG/1; MG/1
1 TABLET ORAL EVERY 4 HOURS PRN
Status: DISCONTINUED | OUTPATIENT
Start: 2018-01-12 | End: 2018-01-13 | Stop reason: HOSPADM

## 2018-01-12 RX ORDER — HYDROCORTISONE ACETATE 25 MG/1
25 SUPPOSITORY RECTAL ONCE
Status: COMPLETED | OUTPATIENT
Start: 2018-01-12 | End: 2018-01-12

## 2018-01-12 RX ADMIN — NITROFURANTOIN (MONOHYDRATE/MACROCRYSTALS) 100 MG: 75; 25 CAPSULE ORAL at 08:01

## 2018-01-12 RX ADMIN — KETOROLAC TROMETHAMINE 15 MG: 30 INJECTION, SOLUTION INTRAMUSCULAR at 10:01

## 2018-01-12 RX ADMIN — KETOROLAC TROMETHAMINE 15 MG: 30 INJECTION, SOLUTION INTRAMUSCULAR at 04:01

## 2018-01-12 RX ADMIN — HYDROCODONE BITARTRATE AND ACETAMINOPHEN 1 TABLET: 10; 325 TABLET ORAL at 04:01

## 2018-01-12 RX ADMIN — HYDROCORTISONE ACETATE 25 MG: 25 SUPPOSITORY RECTAL at 02:01

## 2018-01-12 RX ADMIN — HYDROCODONE BITARTRATE AND ACETAMINOPHEN 1 TABLET: 10; 325 TABLET ORAL at 10:01

## 2018-01-12 RX ADMIN — HYDROCODONE BITARTRATE AND ACETAMINOPHEN 1 TABLET: 5; 325 TABLET ORAL at 04:01

## 2018-01-12 RX ADMIN — DOCUSATE SODIUM 200 MG: 100 CAPSULE, LIQUID FILLED ORAL at 08:01

## 2018-01-12 RX ADMIN — HYDROCODONE BITARTRATE AND ACETAMINOPHEN 1 TABLET: 10; 325 TABLET ORAL at 08:01

## 2018-01-12 RX ADMIN — HYDROCORTISONE 2.5%: 25 CREAM TOPICAL at 02:01

## 2018-01-12 NOTE — PROGRESS NOTES
[facility]  Obstetrics  Postpartum Progress Note    Patient Name: Alena Willis  MRN: [id number]  Admission Date: 1/10/2018  Hospital Length of Stay: 2 days  Attending Physician: Mahogany Heard MD  Primary Care Provider: Keena Pillai NP    Subjective:     Principal Problem: (spontaneous vaginal delivery)    Hospital course: 01/10/2018 Admit to L&D for labor at term, augmentation PRN  2018 -  with no complications.  cc.  2018 - PPD#1, patient is meeting postpartum goals (ambulating, voiding spontaneously, tolerating regular diet). Has not passed flatus or had Bm. Hemorrhoids are very painful.    Interval History: PPD#1 s/p     She is not doing well this morning. She is complaining of very painful hemorrhoids which have been refractory to all treatments (anusol suppository, steroid cream, tuck's pads, ice packs, etc). She is tolerating a regular diet without nausea or vomiting. She is voiding spontaneously. She is ambulating. She has not passed flatus, and has not a BM. Vaginal bleeding is mild. She denies fever or chills. Abdominal pain is mild and controlled with oral medications. She is breastfeeding.     Objective:     Vital Signs (Most Recent):  Temp: 98.2 °F (36.8 °C) (18 0010)  Pulse: 83 (18 0010)  Resp: 18 (18 0010)  BP: (!) 92/55 (18 0010)  SpO2: 96 % (18 0010) Vital Signs (24h Range):  Temp:  [98 °F (36.7 °C)-98.2 °F (36.8 °C)] 98.2 °F (36.8 °C)  Pulse:  [] 83  Resp:  [18] 18  SpO2:  [92 %-98 %] 96 %  BP: ()/(50-59) 92/55     Weight: 89.8 kg (198 lb)  Body mass index is 30.11 kg/m².    No intake or output data in the 24 hours ending 18 0725    Significant Labs:  Lab Results   Component Value Date    GROUPTRH O POS 01/10/2018    HEPBSAG Negative 01/10/2018    STREPBCULT No Group B Streptococcus isolated 2017       Recent Labs  Lab 18  0448   HGB 10.1*  10.1*   HCT 31.1*  31.1*       I have  personallly reviewed all pertinent lab results from the last 24 hours.    Physical Exam:   Constitutional: She is oriented to person, place, and time. She appears well-developed and well-nourished.    HENT:   Head: Normocephalic and atraumatic.    Eyes: EOM are normal.    Neck: Normal range of motion.    Cardiovascular: Normal rate.     Pulmonary/Chest: Effort normal. No respiratory distress.        Abdominal: Soft. There is no tenderness. There is no rebound and no guarding.   Fundus firm below umbilicus             Musculoskeletal: Normal range of motion and moves all extremeties.       Neurological: She is alert and oriented to person, place, and time.    Skin: Skin is warm and dry.    Psychiatric: She has a normal mood and affect. Her behavior is normal. Judgment and thought content normal.       Assessment/Plan:     32 y.o. female  for:    *  (spontaneous vaginal delivery)    Postpartum care:  - Patient doing well. Continue routine management and advances.  - Continue PO pain meds. Pain well controlled.  - Encourage ambulation.   - Heme: Pre Delivery h/h  --> Post Delivery h/h 10/31   - Contraception - does not desire at this time  - Lactation - The patient is breastfeeding. Lactation nurse following along PRN  - Rh Status - pos        Diet controlled gestational diabetes mellitus (GDM) in third trimester    - diet controlled  -  in [place] on admission  - postpartum AM fasting glucose: 91        Maternal asthma complicating pregnancy    - no home meds  - duo nebs PRN        Anxiety and depression    - no meds, discuss postpartum antidepressant PRN            Disposition: As patient meets milestones, will plan to discharge PPD#1-2.    Sushma K Reddy, MD  Obstetrics  [facility]

## 2018-01-12 NOTE — LACTATION NOTE
Patient confidently reports that breastfeeding is going well and denies questions or concerns at this time. Contact number on white board. To call lactation for assistance as needed.

## 2018-01-12 NOTE — LACTATION NOTE
"Lactation rounds. Reviewed what to expect in the early  period, infant feeding/hunger cues, cue based feeding, proper positioning and latch technique, skin to skin contact, unrestricted access to breast, and manual expression of milk. Assisted with feeding attempt, and baby falls asleep, not achieving adequate latch. Placed skin to skin and unable to elicit feeding cues. Suck assessment reveals disorganized suck with poor seal. Offered to assist with placing baby back to breast, and patient wishes to hold baby skin to skin at this time. Encouraged to feed on cue 8 or more times per day and to request assistance as needed. Provided contact number for lactation. Verbalizes understanding.     18 [id number]   Maternal Infant Assessment   Breast Shape pendulous   Breast Density soft   Nipple(s) everted   Infant Assessment   Sucking Reflex (disorganized)   LATCH Score   Latch 0-->too sleepy or reluctant, no latch achieved   Audible Swallowing 0-->none   Type Of Nipple 2-->everted (after stimulation)   Comfort (Breast/Nipple) 2-->soft/nontender   Hold (Positioning) 0-->full assist (staff holds infant at breast)   Score (less than 7 for 2/more consecutive times, consult Lactation Consultant) 4   Pain/Comfort Assessments   Pain Assessment Performed Yes   Pain Reassessment   Pain Rating Post Med Admin 5       Number Scale   Presence of Pain complains of pain/discomfort   Maternal Infant Feeding   Maternal Emotional State relaxed   Infant Positioning clutch/"football"   Time Spent (min) 30-60 min   Latch Assistance yes   Breastfeeding Education milk expression, hand   Feeding Infant   Effective Latch During Feeding no     "

## 2018-01-12 NOTE — SUBJECTIVE & OBJECTIVE
Hospital course: 01/10/2018 Admit to L&D for labor at term, augmentation PRN  2018 -  with no complications.  cc.  2018 - PPD#1, patient is meeting postpartum goals (ambulating, voiding spontaneously, tolerating regular diet). Has not passed flatus or had Bm. Hemorrhoids are very painful.    Interval History: PPD#1 s/p     She is not doing well this morning. She is complaining of very painful hemorrhoids which have been refractory to all treatments (anusol suppository, steroid cream, tuck's pads, ice packs, etc). She is tolerating a regular diet without nausea or vomiting. She is voiding spontaneously. She is ambulating. She has not passed flatus, and has not a BM. Vaginal bleeding is mild. She denies fever or chills. Abdominal pain is mild and controlled with oral medications. She is breastfeeding.     Objective:     Vital Signs (Most Recent):  Temp: 98.2 °F (36.8 °C) (18 0010)  Pulse: 83 (18 0010)  Resp: 18 (18 0010)  BP: (!) 92/55 (18 0010)  SpO2: 96 % (18 0010) Vital Signs (24h Range):  Temp:  [98 °F (36.7 °C)-98.2 °F (36.8 °C)] 98.2 °F (36.8 °C)  Pulse:  [] 83  Resp:  [18] 18  SpO2:  [92 %-98 %] 96 %  BP: ()/(50-59) 92/55     Weight: 89.8 kg (198 lb)  Body mass index is 30.11 kg/m².    No intake or output data in the 24 hours ending 18 0725    Significant Labs:  Lab Results   Component Value Date    GROUPTRH O POS 01/10/2018    HEPBSAG Negative 01/10/2018    STREPBCULT No Group B Streptococcus isolated 2017       Recent Labs  Lab 18  0448   HGB 10.1*  10.1*   HCT 31.1*  31.1*       I have personallly reviewed all pertinent lab results from the last 24 hours.    Physical Exam:   Constitutional: She is oriented to person, place, and time. She appears well-developed and well-nourished.    HENT:   Head: Normocephalic and atraumatic.    Eyes: EOM are normal.    Neck: Normal range of motion.    Cardiovascular: Normal rate.      Pulmonary/Chest: Effort normal. No respiratory distress.        Abdominal: Soft. There is no tenderness. There is no rebound and no guarding.   Fundus firm below umbilicus             Musculoskeletal: Normal range of motion and moves all extremeties.       Neurological: She is alert and oriented to person, place, and time.    Skin: Skin is warm and dry.    Psychiatric: She has a normal mood and affect. Her behavior is normal. Judgment and thought content normal.

## 2018-01-12 NOTE — ASSESSMENT & PLAN NOTE
Postpartum care:  - Patient doing well. Continue routine management and advances.  - Continue PO pain meds. Pain well controlled.  - Encourage ambulation.   - Heme: Pre Delivery h/h 11/33 --> Post Delivery h/h 10/31   - Contraception - does not desire at this time  - Lactation - The patient is breastfeeding. Lactation nurse following along PRN  - Rh Status - pos

## 2018-01-12 NOTE — ANESTHESIA POSTPROCEDURE EVALUATION
"Anesthesia Post Evaluation    Patient: Alena Willis    Procedure(s) Performed: * No procedures listed *    Final Anesthesia Type: epidural  Patient location during evaluation: labor & delivery  Patient participation: Yes- Able to Participate  Level of consciousness: awake and alert and oriented  Post-procedure vital signs: reviewed and stable  Pain management: adequate  Airway patency: patent  PONV status at discharge: No PONV  Anesthetic complications: no      Cardiovascular status: blood pressure returned to baseline, hemodynamically stable and stable  Respiratory status: unassisted, spontaneous ventilation and room air  Hydration status: euvolemic  Follow-up not needed.        Visit Vitals  BP (!) 99/51   Pulse 81   Temp 36.6 °C (97.8 °F) (Oral)   Resp 18   Ht 5' 8" (1.727 m)   Wt 89.8 kg (198 lb)   LMP 04/18/2017   SpO2 97%   Breastfeeding? Yes   BMI 30.11 kg/m²       Pain/Abhilash Score: Pain Rating Prior to Med Admin: 7 (1/12/2018  8:55 AM)  Pain Rating Post Med Admin: 9 (1/12/2018  5:30 AM)      "

## 2018-01-13 VITALS
WEIGHT: 198 LBS | HEIGHT: 68 IN | RESPIRATION RATE: 18 BRPM | HEART RATE: 79 BPM | TEMPERATURE: 98 F | BODY MASS INDEX: 30.01 KG/M2 | DIASTOLIC BLOOD PRESSURE: 53 MMHG | SYSTOLIC BLOOD PRESSURE: 97 MMHG | OXYGEN SATURATION: 98 %

## 2018-01-13 PROCEDURE — 63600175 PHARM REV CODE 636 W HCPCS: Performed by: OBSTETRICS & GYNECOLOGY

## 2018-01-13 PROCEDURE — 25000003 PHARM REV CODE 250: Performed by: STUDENT IN AN ORGANIZED HEALTH CARE EDUCATION/TRAINING PROGRAM

## 2018-01-13 PROCEDURE — 99238 HOSP IP/OBS DSCHRG MGMT 30/<: CPT | Mod: ,,, | Performed by: OBSTETRICS & GYNECOLOGY

## 2018-01-13 RX ORDER — HYDROCODONE BITARTRATE AND ACETAMINOPHEN 5; 325 MG/1; MG/1
1 TABLET ORAL EVERY 4 HOURS PRN
Qty: 15 TABLET | Refills: 0 | Status: SHIPPED | OUTPATIENT
Start: 2018-01-13 | End: 2018-01-19

## 2018-01-13 RX ORDER — IBUPROFEN 600 MG/1
600 TABLET ORAL EVERY 6 HOURS PRN
Qty: 20 TABLET | Refills: 30 | OUTPATIENT
Start: 2018-01-13 | End: 2022-05-24

## 2018-01-13 RX ADMIN — HYDROCORTISONE 2.5%: 25 CREAM TOPICAL at 10:01

## 2018-01-13 RX ADMIN — KETOROLAC TROMETHAMINE 15 MG: 30 INJECTION, SOLUTION INTRAMUSCULAR at 10:01

## 2018-01-13 RX ADMIN — HYDROCODONE BITARTRATE AND ACETAMINOPHEN 1 TABLET: 5; 325 TABLET ORAL at 08:01

## 2018-01-13 RX ADMIN — HYDROCODONE BITARTRATE AND ACETAMINOPHEN 1 TABLET: 10; 325 TABLET ORAL at 03:01

## 2018-01-13 RX ADMIN — KETOROLAC TROMETHAMINE 15 MG: 30 INJECTION, SOLUTION INTRAMUSCULAR at 04:01

## 2018-01-13 RX ADMIN — DOCUSATE SODIUM 200 MG: 100 CAPSULE, LIQUID FILLED ORAL at 10:01

## 2018-01-13 NOTE — PLAN OF CARE
Problem: Patient Care Overview  Goal: Discharge Needs Assessment  Outcome: Outcome(s) achieved Date Met: 01/13/18  Pt being discharged today. Moderate rubra, fundus firm, ambulating and voiding appropriately. Breastfeeding and boding with infant well. All discharge instructions given, verbalizes understanding, all questions answered.

## 2018-01-13 NOTE — ASSESSMENT & PLAN NOTE
- diet controlled  -  in [place] on admission  - postpartum AM fasting glucose: 91  - pt made aware she will need GTT postpartum

## 2018-01-13 NOTE — PROGRESS NOTES
[facility]  Obstetrics  Postpartum Progress Note    Patient Name: Alena Willis  MRN: [id number]  Admission Date: 1/10/2018  Hospital Length of Stay: 3 days  Attending Physician: Mahogany Heard MD  Primary Care Provider: Keena Pillai NP    Subjective:     Principal Problem: (spontaneous vaginal delivery)    Hospital course: 01/10/2018 Admit to L&D for labor at term, augmentation PRN  2018 -  with no complications.  cc.  2018 - PPD#1, patient is meeting postpartum goals (ambulating, voiding spontaneously, tolerating regular diet). Has not passed flatus or had Bm. Hemorrhoids are very painful.  2018 - PPD#2. Pt doing well. Meeting all postoperative milestones. Anticipate discharge today after staff rounds.    Interval History: PPD#2 s/p     She is doing well this morning.  She is tolerating a regular diet without nausea or vomiting. She is voiding spontaneously. She is ambulating. She has not passed flatus, and has not a BM. Vaginal bleeding is mild. She denies fever or chills. Abdominal pain is mild and controlled with oral medications. She is breastfeeding. Ready for discharge today.     Objective:     Vital Signs (Most Recent):  Temp: 98.5 °F (36.9 °C) (18 0000)  Pulse: 82 (18 0000)  Resp: 18 (18 0000)  BP: (!) 90/52 (18 0000)  SpO2: 96 % (18 0000) Vital Signs (24h Range):  Temp:  [97.8 °F (36.6 °C)-98.5 °F (36.9 °C)] 98.5 °F (36.9 °C)  Pulse:  [71-82] 82  Resp:  [18] 18  SpO2:  [96 %-98 %] 96 %  BP: (90-99)/(51-53) 90/52     Weight: 89.8 kg (198 lb)  Body mass index is 30.11 kg/m².    No intake or output data in the 24 hours ending 18 0646    Significant Labs:  Lab Results   Component Value Date    GROUPTRH O POS 01/10/2018    HEPBSAG Negative 01/10/2018    STREPBCULT No Group B Streptococcus isolated 2017       Recent Labs  Lab 18  0448   HGB 10.1*  10.1*   HCT 31.1*  31.1*       I have personallly  reviewed all pertinent lab results from the last 24 hours.    Physical Exam:   Constitutional: She is oriented to person, place, and time. She appears well-developed and well-nourished.    HENT:   Head: Normocephalic and atraumatic.    Eyes: EOM are normal.    Neck: Normal range of motion.    Cardiovascular: Normal rate.     Pulmonary/Chest: Effort normal. No respiratory distress.        Abdominal: Soft. There is no tenderness. There is no rebound and no guarding.   Fundus firm below umbilicus             Musculoskeletal: Normal range of motion and moves all extremeties.       Neurological: She is alert and oriented to person, place, and time.    Skin: Skin is warm and dry.    Psychiatric: She has a normal mood and affect. Her behavior is normal. Judgment and thought content normal.       Assessment/Plan:     32 y.o. female  for:    *  (spontaneous vaginal delivery)    Postpartum care:  - Patient doing well. Continue routine management and advances.  - Continue PO pain meds. Pain well controlled.  - Encourage ambulation.   - Heme: Pre Delivery h/h  --> Post Delivery h/h 10/31   - Contraception - does not desire at this time  - Lactation - The patient is breastfeeding. Lactation nurse following along PRN  - Rh Status - pos        Diet controlled gestational diabetes mellitus (GDM) in third trimester    - diet controlled  -  in [place] on admission  - postpartum AM fasting glucose: 91  - pt made aware she will need GTT postpartum        Maternal asthma complicating pregnancy    - no home meds  - duo nebs PRN        Anxiety and depression    - no meds, discuss postpartum antidepressant PRN            Disposition: As patient meets milestones, will plan to discharge today after staff rounds.    Alma Resendiz MD  Obstetrics  [facility]

## 2018-01-13 NOTE — LACTATION NOTE
"Seen pt for lactation counseling.  Pt reports breastfeeding is getting well, with audible swallows during nursing.  Pt confidently nursing the baby.  Discharge instructions provided. Answered questions and reviewed breastfeeding manual. Gave lactation warm line.     01/13/18 1055   Maternal Infant Assessment   Breast Shape pendulous   Breast Density soft   Areola elastic   Nipple(s) everted   Infant Assessment   Sucking Reflex present   Rooting Reflex present   Swallow Reflex present   LATCH Score   Latch 1-->repeated attempts, holds nipple in mouth, stimulate to suck   Audible Swallowing 2-->spontaneous and intermittent (24 hrs old)   Type Of Nipple 2-->everted (after stimulation)   Comfort (Breast/Nipple) 2-->soft/nontender   Hold (Positioning) 2-->no assist from staff, mother able to position/hold infant   Score (less than 7 for 2/more consecutive times, consult Lactation Consultant) 9       Number Scale   Presence of Pain denies   Maternal Infant Feeding   Maternal Emotional State independent   Infant Positioning clutch/"football"   Signs of Milk Transfer audible swallow   Time Spent (min) 0-15 min   Nipple Shape After Feeding, Right round   Latch Assistance no   Engorgement Measures other (see comments)  (education)   Breastfeeding Education adequate infant intake;importance of skin-to-skin contact;milk expression, hand;other (see comments)  (discharge)   Feeding Infant   Effective Latch During Feeding yes   Audible Swallow yes   Skin-to-Skin Contact During Feeding no  (encouraged)   Lactation Referrals   Lactation Consult Breastfeeding assessment;Other (Comment)  (discharge education/ latch check)   Lactation Interventions   Breastfeeding Assistance feeding session observed;support offered   Maternal Breastfeeding Support lactation counseling provided       "

## 2018-01-13 NOTE — SUBJECTIVE & OBJECTIVE
Hospital course: 01/10/2018 Admit to L&D for labor at term, augmentation PRN  2018 -  with no complications.  cc.  2018 - PPD#1, patient is meeting postpartum goals (ambulating, voiding spontaneously, tolerating regular diet). Has not passed flatus or had Bm. Hemorrhoids are very painful.  2018 - PPD#2. Pt doing well. Meeting all postoperative milestones. Anticipate discharge today after staff rounds.    Interval History: PPD#2 s/p     She is doing well this morning.  She is tolerating a regular diet without nausea or vomiting. She is voiding spontaneously. She is ambulating. She has not passed flatus, and has not a BM. Vaginal bleeding is mild. She denies fever or chills. Abdominal pain is mild and controlled with oral medications. She is breastfeeding. Ready for discharge today.     Objective:     Vital Signs (Most Recent):  Temp: 98.5 °F (36.9 °C) (18 0000)  Pulse: 82 (18 0000)  Resp: 18 (18 0000)  BP: (!) 90/52 (18 0000)  SpO2: 96 % (18 0000) Vital Signs (24h Range):  Temp:  [97.8 °F (36.6 °C)-98.5 °F (36.9 °C)] 98.5 °F (36.9 °C)  Pulse:  [71-82] 82  Resp:  [18] 18  SpO2:  [96 %-98 %] 96 %  BP: (90-99)/(51-53) 90/52     Weight: 89.8 kg (198 lb)  Body mass index is 30.11 kg/m².    No intake or output data in the 24 hours ending 18 0646    Significant Labs:  Lab Results   Component Value Date    GROUPTRH O POS 01/10/2018    HEPBSAG Negative 01/10/2018    STREPBCULT No Group B Streptococcus isolated 2017       Recent Labs  Lab 18  0448   HGB 10.1*  10.1*   HCT 31.1*  31.1*       I have personallly reviewed all pertinent lab results from the last 24 hours.    Physical Exam:   Constitutional: She is oriented to person, place, and time. She appears well-developed and well-nourished.    HENT:   Head: Normocephalic and atraumatic.    Eyes: EOM are normal.    Neck: Normal range of motion.    Cardiovascular: Normal rate.      Pulmonary/Chest: Effort normal. No respiratory distress.        Abdominal: Soft. There is no tenderness. There is no rebound and no guarding.   Fundus firm below umbilicus             Musculoskeletal: Normal range of motion and moves all extremeties.       Neurological: She is alert and oriented to person, place, and time.    Skin: Skin is warm and dry.    Psychiatric: She has a normal mood and affect. Her behavior is normal. Judgment and thought content normal.

## 2018-01-13 NOTE — DISCHARGE SUMMARY
[facility]  Obstetrics  Discharge Summary      Patient Name: Alena Willis  MRN: [id number]  Admission Date: 1/10/2018  Hospital Length of Stay: 3 days  Discharge Date and Time:  2018 6:47 AM  Attending Physician: Mahogany Heard MD   Discharging Provider: Alma Resendiz MD  Primary Care Provider: Keena Pillai NP    HPI: Alena Willis is a 32 y.o. [id number] at 38w1d admitted from [facility] ([place] for labor at term.   This IUP is complicated by gMD (diet), asthma, history of anxiety/depression (no meds). She has also been taking antibiotics for a persistent E coli UTI this pregnancy.  Patient reports contractions, denies vaginal bleeding, denies LOF.   Fetal Movement: normal .      Blood Type O POS   GBBS: negative  Rubella: Immune  RPR: NR  HIV: negative  HepB: negative    Surgical history: ovarian cyst removal and cervical tumor removal     Allergic to Kflex and Percocet    * No surgery found *     Hospital Course:   01/10/2018 Admit to L&D for labor at term, augmentation PRN  2018 -  with no complications.  cc.  2018 - PPD#1, patient is meeting postpartum goals (ambulating, voiding spontaneously, tolerating regular diet). Has not passed flatus or had Bm. Hemorrhoids are very painful.  2018 - PPD#2. Pt doing well. Meeting all postoperative milestones. Anticipate discharge today after staff rounds.        Final Active Diagnoses:    Diagnosis Date Noted POA    PRINCIPAL PROBLEM:   (spontaneous vaginal delivery) [O80] 2018 Not Applicable    Indication for care in labor or delivery [O75.9] 2018 Yes    Diet controlled gestational diabetes mellitus (GDM) in third trimester [O24.410] 2017 Yes    Maternal asthma complicating pregnancy [O99.519, J45.909] 2017 Yes    Anxiety and depression [F41.8] 06/15/2015 Yes      Problems Resolved During this Admission:    Diagnosis Date Noted Date Resolved POA    Pregnancy, supervision, normal,  first/breast/vasectomy, flu [Z34.00] 08/02/2017 01/12/2018 Not Applicable        Labs:   CBC   Recent Labs  Lab 01/12/18  0448   WBC 11.41  11.41   HGB 10.1*  10.1*   HCT 31.1*  31.1*     201       Feeding Method: breast    Immunizations     Date Immunization Status Dose Route/Site Given by    04/01/86 0000 Measles Given       04/01/86 0000 Mumps Given       04/01/86 0000 Rubella Given       04/01/89 0000 Td - PF (ADULT) Given       01/11/18 1045 MMR Incomplete 0.5 mL Subcutaneous/Left deltoid     01/11/18 1045 Tdap Incomplete 0.5 mL Intramuscular/Left deltoid           Delivery:    Episiotomy: None   Lacerations: 2nd   Repair suture:     Repair # of packets:     Blood loss (ml): 300     Birth information:  YOB: 2018   Time of birth: 7:12 AM   Sex: female   Delivery type: Vaginal, Spontaneous Delivery   Gestational Age: 38w2d    Delivery Clinician:      Other providers:       Additional  information:  Forceps:    Vacuum:    Breech:    Observed anomalies      Living?:           APGARS  One minute Five minutes Ten minutes   Skin color:         Heart rate:         Grimace:         Muscle tone:         Breathing:         Totals: 8  9        Placenta: Delivered:       appearance    Pending Diagnostic Studies:     None          Discharged Condition: good    Disposition: Home or Self Care    Follow Up:  Follow-up Information     [place] - OB/ GYN In 6 weeks.    Specialty:  Obstetrics and Gynecology  Why:  Postpartum and GTT  Contact information:  [address] [facility] [address] [phone]               Patient Instructions:     Activity as tolerated     Lifting restrictions     Other restrictions (specify):   Order Comments: Pelvic rest until post partum visit. (No sex, no tampons, no douching, etc.)     Call MD for:  temperature >100.4     Call MD for:  persistent nausea and vomiting or diarrhea     Call MD for:  severe uncontrolled pain     Call MD for:  difficulty  breathing or increased cough     Call MD for:  severe persistent headache     Call MD for:  persistent dizziness, light-headedness, or visual disturbances     Call MD for:  increased confusion or weakness     Call MD for:   Order Comments: Vaginal bleeding through one or more pads each hour for 2 hours       Medications:  Current Discharge Medication List      START taking these medications    Details   ibuprofen (ADVIL,MOTRIN) 600 MG tablet Take 1 tablet (600 mg total) by mouth every 6 (six) hours as needed for Pain.  Qty: 20 tablet, Refills: 30    Associated Diagnoses:  (spontaneous vaginal delivery)         CONTINUE these medications which have NOT CHANGED    Details   blood-glucose meter kit Patient will be SMBG 4x/day. Please provide with appropriate strips and lancets.  Qty: 1 each, Refills: 0    Associated Diagnoses: Diet controlled gestational diabetes mellitus (GDM) in third trimester      cetirizine (ZYRTEC) 5 MG tablet Take 1 tablet (5 mg total) by mouth once daily.  Qty: 30 tablet, Refills: 12      docusate sodium (COLACE) 100 MG capsule Take 1 capsule (100 mg total) by mouth 3 (three) times daily as needed for Constipation.  Qty: 60 capsule, Refills: 0      nitrofurantoin, macrocrystal-monohydrate, (MACROBID) 100 MG capsule       prenatal vit no.126-iron-folic (CLASSIC PRENATAL) 28 mg iron- 800 mcg Tab Take 1 tablet by mouth once daily.      ranitidine (ZANTAC) 150 MG tablet Take 1 tablet (150 mg total) by mouth 2 (two) times daily.  Qty: 60 tablet, Refills: 1      TRUE METRIX GLUCOSE METER Misc USE to test 4 TIMES DAILY  Refills: 0      TRUE METRIX GLUCOSE TEST STRIP Strp       TRUEPLUS LANCETS 30 gauge Misc       VENTOLIN HFA 90 mcg/actuation inhaler Inhale 2 puffs into the lungs every 6 (six) hours as needed.  Refills: 6             Alma Resendiz MD  Obstetrics  [facility]

## 2018-01-18 ENCOUNTER — PATIENT MESSAGE (OUTPATIENT)
Dept: OBSTETRICS AND GYNECOLOGY | Facility: CLINIC | Age: 33
End: 2018-01-18

## 2018-01-18 DIAGNOSIS — G89.18 EPISIOTOMY PAIN: Primary | ICD-10-CM

## 2018-01-19 ENCOUNTER — PATIENT MESSAGE (OUTPATIENT)
Dept: OBSTETRICS AND GYNECOLOGY | Facility: CLINIC | Age: 33
End: 2018-01-19

## 2018-01-19 RX ORDER — HYDROCODONE BITARTRATE AND ACETAMINOPHEN 7.5; 325 MG/1; MG/1
1 TABLET ORAL EVERY 4 HOURS PRN
Qty: 21 TABLET | Refills: 0 | Status: SHIPPED | OUTPATIENT
Start: 2018-01-19

## 2018-04-29 ENCOUNTER — HOSPITAL ENCOUNTER (EMERGENCY)
Facility: HOSPITAL | Age: 33
Discharge: HOME | End: 2018-04-29
Attending: EMERGENCY MEDICINE
Payer: MEDICAID

## 2018-04-29 VITALS
WEIGHT: 180 LBS | RESPIRATION RATE: 18 BRPM | SYSTOLIC BLOOD PRESSURE: 108 MMHG | DIASTOLIC BLOOD PRESSURE: 69 MMHG | BODY MASS INDEX: 27.28 KG/M2 | TEMPERATURE: 98 F | HEIGHT: 68 IN | OXYGEN SATURATION: 97 % | HEART RATE: 67 BPM

## 2018-04-29 DIAGNOSIS — E86.0 DEHYDRATION: ICD-10-CM

## 2018-04-29 DIAGNOSIS — R11.10 VOMITING AND DIARRHEA: Primary | ICD-10-CM

## 2018-04-29 DIAGNOSIS — R19.7 VOMITING AND DIARRHEA: Primary | ICD-10-CM

## 2018-04-29 LAB
ALBUMIN SERPL BCP-MCNC: 4 G/DL
ALP SERPL-CCNC: 76 U/L
ALT SERPL W/O P-5'-P-CCNC: 19 U/L
ANION GAP SERPL CALC-SCNC: 11 MMOL/L
AST SERPL-CCNC: 12 U/L
B-HCG UR QL: NEGATIVE
BACTERIA #/AREA URNS AUTO: ABNORMAL /HPF
BASOPHILS # BLD AUTO: 0.03 K/UL
BASOPHILS NFR BLD: 0.2 %
BILIRUB SERPL-MCNC: 0.3 MG/DL
BILIRUB UR QL STRIP: NEGATIVE
BUN SERPL-MCNC: 7 MG/DL
CALCIUM SERPL-MCNC: 9.1 MG/DL
CHLORIDE SERPL-SCNC: 104 MMOL/L
CLARITY UR REFRACT.AUTO: ABNORMAL
CO2 SERPL-SCNC: 22 MMOL/L
COLOR UR AUTO: ABNORMAL
CREAT SERPL-MCNC: 0.8 MG/DL
CTP QC/QA: YES
DIFFERENTIAL METHOD: ABNORMAL
EOSINOPHIL # BLD AUTO: 0.1 K/UL
EOSINOPHIL NFR BLD: 0.8 %
ERYTHROCYTE [DISTWIDTH] IN BLOOD BY AUTOMATED COUNT: 14.6 %
EST. GFR  (AFRICAN AMERICAN): >60 ML/MIN/1.73 M^2
EST. GFR  (NON AFRICAN AMERICAN): >60 ML/MIN/1.73 M^2
GLUCOSE SERPL-MCNC: 100 MG/DL
GLUCOSE UR QL STRIP: NEGATIVE
HCT VFR BLD AUTO: 43 %
HGB BLD-MCNC: 14 G/DL
HGB UR QL STRIP: ABNORMAL
HYALINE CASTS UR QL AUTO: 0 /LPF
IMM GRANULOCYTES # BLD AUTO: 0.05 K/UL
IMM GRANULOCYTES NFR BLD AUTO: 0.3 %
KETONES UR QL STRIP: NEGATIVE
LEUKOCYTE ESTERASE UR QL STRIP: ABNORMAL
LIPASE SERPL-CCNC: 21 U/L
LYMPHOCYTES # BLD AUTO: 1.7 K/UL
LYMPHOCYTES NFR BLD: 11 %
MCH RBC QN AUTO: 29.4 PG
MCHC RBC AUTO-ENTMCNC: 32.6 G/DL
MCV RBC AUTO: 90 FL
MICROSCOPIC COMMENT: ABNORMAL
MONOCYTES # BLD AUTO: 0.8 K/UL
MONOCYTES NFR BLD: 4.8 %
NEUTROPHILS # BLD AUTO: 12.8 K/UL
NEUTROPHILS NFR BLD: 82.9 %
NITRITE UR QL STRIP: NEGATIVE
NRBC BLD-RTO: 0 /100 WBC
PH UR STRIP: 7 [PH] (ref 5–8)
PLATELET # BLD AUTO: 320 K/UL
PMV BLD AUTO: 11.7 FL
POTASSIUM SERPL-SCNC: 3.4 MMOL/L
PROT SERPL-MCNC: 7.4 G/DL
PROT UR QL STRIP: ABNORMAL
RBC # BLD AUTO: 4.77 M/UL
RBC #/AREA URNS AUTO: 14 /HPF (ref 0–4)
SODIUM SERPL-SCNC: 137 MMOL/L
SP GR UR STRIP: 1.02 (ref 1–1.03)
SQUAMOUS #/AREA URNS AUTO: 9 /HPF
URN SPEC COLLECT METH UR: ABNORMAL
UROBILINOGEN UR STRIP-ACNC: 4 EU/DL
WBC # BLD AUTO: 15.48 K/UL
WBC #/AREA URNS AUTO: 14 /HPF (ref 0–5)

## 2018-04-29 PROCEDURE — 85025 COMPLETE CBC W/AUTO DIFF WBC: CPT

## 2018-04-29 PROCEDURE — 96375 TX/PRO/DX INJ NEW DRUG ADDON: CPT

## 2018-04-29 PROCEDURE — 81025 URINE PREGNANCY TEST: CPT | Performed by: EMERGENCY MEDICINE

## 2018-04-29 PROCEDURE — 80053 COMPREHEN METABOLIC PANEL: CPT

## 2018-04-29 PROCEDURE — 96361 HYDRATE IV INFUSION ADD-ON: CPT

## 2018-04-29 PROCEDURE — 83690 ASSAY OF LIPASE: CPT

## 2018-04-29 PROCEDURE — 81001 URINALYSIS AUTO W/SCOPE: CPT

## 2018-04-29 PROCEDURE — 63600175 PHARM REV CODE 636 W HCPCS: Performed by: EMERGENCY MEDICINE

## 2018-04-29 PROCEDURE — 99284 EMERGENCY DEPT VISIT MOD MDM: CPT | Mod: 25

## 2018-04-29 PROCEDURE — 96374 THER/PROPH/DIAG INJ IV PUSH: CPT

## 2018-04-29 PROCEDURE — 25000003 PHARM REV CODE 250: Performed by: EMERGENCY MEDICINE

## 2018-04-29 PROCEDURE — S0028 INJECTION, FAMOTIDINE, 20 MG: HCPCS | Performed by: EMERGENCY MEDICINE

## 2018-04-29 RX ORDER — FAMOTIDINE 20 MG/1
20 TABLET, FILM COATED ORAL 2 TIMES DAILY
Qty: 30 TABLET | Refills: 0 | Status: SHIPPED | OUTPATIENT
Start: 2018-04-29 | End: 2019-04-29

## 2018-04-29 RX ORDER — PROMETHAZINE HYDROCHLORIDE 25 MG/1
25 TABLET ORAL EVERY 6 HOURS PRN
Qty: 15 TABLET | Refills: 0 | OUTPATIENT
Start: 2018-04-29 | End: 2022-05-24

## 2018-04-29 RX ORDER — FAMOTIDINE 10 MG/ML
20 INJECTION INTRAVENOUS
Status: COMPLETED | OUTPATIENT
Start: 2018-04-29 | End: 2018-04-29

## 2018-04-29 RX ORDER — SODIUM CHLORIDE 9 MG/ML
1000 INJECTION, SOLUTION INTRAVENOUS
Status: DISCONTINUED | OUTPATIENT
Start: 2018-04-29 | End: 2018-04-29 | Stop reason: HOSPADM

## 2018-04-29 RX ORDER — METOCLOPRAMIDE HYDROCHLORIDE 5 MG/ML
10 INJECTION INTRAMUSCULAR; INTRAVENOUS
Status: COMPLETED | OUTPATIENT
Start: 2018-04-29 | End: 2018-04-29

## 2018-04-29 RX ORDER — ONDANSETRON 4 MG/1
4 TABLET, ORALLY DISINTEGRATING ORAL EVERY 6 HOURS PRN
Qty: 15 TABLET | Refills: 0 | Status: SHIPPED | OUTPATIENT
Start: 2018-04-29

## 2018-04-29 RX ADMIN — METOCLOPRAMIDE 10 MG: 5 INJECTION, SOLUTION INTRAMUSCULAR; INTRAVENOUS at 05:04

## 2018-04-29 RX ADMIN — FAMOTIDINE 20 MG: 10 INJECTION INTRAVENOUS at 05:04

## 2018-04-29 RX ADMIN — SODIUM CHLORIDE 1000 ML: 0.9 INJECTION, SOLUTION INTRAVENOUS at 05:04

## 2018-04-29 NOTE — ED TRIAGE NOTES
Patient presents to the ED c/o nausea, vomiting, diarrhea and headaches x 3 days. Denies any blood in stool or emesis. Denies chest pain or SOB.

## 2018-04-29 NOTE — ED PROVIDER NOTES
"Encounter Date: 4/29/2018    SCRIBE #1 NOTE: I, Aleks Adkins, am scribing for, and in the presence of,  Dr. Hickey. I have scribed the following portions of the note - Other sections scribed: HPI, ROS, PE.       History     Chief Complaint   Patient presents with    Nausea     reports constant nausea and emesis x 2 days; pt denies blood; reports being weak and feeling dehydrated    Diarrhea     pt reports liquid-like stool x 2 days; denies blood     Time seen by provider: 4:50 AM    This is a 33 y.o. female with history of diverticulitis, irritable bowel syndrome, who presents to the the Emergency Department with complaint of nausea, vomiting, diarrhea. Patient states that she has difficulty tolerating PO. Patient denies hematemesis, blood in stool, abdominal pain, fever, dizzyness, lightheadedeness, rhinorrhea, sore throat, cough, sore throat, history of smoking, illicit drug use, EtOH use. Patient endorses headache which is non-specific and "aggravating" in quality. Patient reports expected vaginal bleeding. She denies any sick contacts or recent sexual activity. Patient states that she is recently post-partum, not actively breastfeeding, natural delivery without complications.       The history is provided by the patient and medical records.     Review of patient's allergies indicates:   Allergen Reactions    Keflex [cephalexin] Swelling and Rash    Percocet [oxycodone-acetaminophen] Other (See Comments)     nausea, abdominal cramping - "just doesn't like taking it" - patient took morning of 2/2/2016       Past Medical History:   Diagnosis Date    Anxiety     Asthma     Depression     Diet controlled gestational diabetes mellitus (GDM) in third trimester 11/21/2017    History of diverticulitis     IBS (irritable bowel syndrome)      Past Surgical History:   Procedure Laterality Date    CERVIX SURGERY      removal of tumor    OVARIAN CYST REMOVAL       Family History   Problem Relation Age of Onset    " Diabetes Paternal Grandfather     Hypertension Paternal Grandfather     Diabetes Paternal Grandmother     Hypertension Paternal Grandmother     Diabetes Maternal Grandmother     Hypertension Maternal Grandmother     Diabetes Maternal Grandfather     Hypertension Maternal Grandfather     Diabetes Father     Hypertension Father     Diabetes Mother     Hypertension Mother     Ovarian cancer Mother      Had hysterectomy at age 37    Diabetes Paternal Aunt     Diabetes Paternal Uncle     Breast cancer Neg Hx     Colon cancer Neg Hx      Social History   Substance Use Topics    Smoking status: Current Every Day Smoker     Packs/day: 0.50     Years: 14.00     Types: Cigarettes    Smokeless tobacco: Never Used    Alcohol use No     Review of Systems   Constitutional: Positive for appetite change. Negative for chills and fever.   HENT: Negative for rhinorrhea and sore throat.    Respiratory: Negative for cough and shortness of breath.    Cardiovascular: Negative for chest pain.   Gastrointestinal: Positive for diarrhea, nausea and vomiting. Negative for abdominal pain and blood in stool.   Genitourinary: Positive for vaginal bleeding. Negative for dysuria.   Musculoskeletal: Negative for back pain.   Skin: Negative for rash.   Neurological: Positive for headaches. Negative for dizziness, weakness and light-headedness.   Hematological: Does not bruise/bleed easily.       Physical Exam     Initial Vitals [04/29/18 0424]   BP Pulse Resp Temp SpO2   108/69 67 18 97.7 °F (36.5 °C) 97 %      MAP       82         Physical Exam    Nursing note and vitals reviewed.  Constitutional: She appears well-developed and well-nourished.   HENT:   Head: Atraumatic.   Mouth/Throat: Mucous membranes are dry. No oropharyngeal exudate.   Eyes: Pupils are equal, round, and reactive to light.   Neck: Normal range of motion. Neck supple.   Cardiovascular: Normal rate, regular rhythm, normal heart sounds and intact distal pulses.    Pulmonary/Chest: Breath sounds normal. No respiratory distress. She has no wheezes. She has no rhonchi. She has no rales. She exhibits no tenderness.   Abdominal: Soft. She exhibits no distension and no mass. There is no tenderness. There is no rebound and no guarding.   Musculoskeletal: She exhibits no edema or tenderness.   Neurological: She is alert and oriented to person, place, and time.   Skin: Skin is warm and dry. Capillary refill takes less than 2 seconds.   Psychiatric: She has a normal mood and affect. Her behavior is normal.         ED Course   Procedures  Labs Reviewed - No data to display          Medical Decision Making:   History:   Old Medical Records: I decided to obtain old medical records.  Clinical Tests:   Lab Tests: Ordered and Reviewed  ED Management:  0603. Patient's blood work shows mild signs of UTI. Patient reports some improvement with 1L of IV fluids, but continues to feel dehydrated. She states that she was able to urinate with ease. Will administer another 1L normal saline and PO challenge.     Pt reports feeling better and requested d/c.  Pt denies any discomfort with urination.  We discussed antibiotics and pt in agreement not appropriate due to GI symptoms,  Return instructions discussed in detail.  Pt appeared well at time of d/c(refused 2nd L)             Scribe Attestation:   Scribe #1: I performed the above scribed service and the documentation accurately describes the services I performed. I attest to the accuracy of the note.            ED Course as of Apr 29 0603   Sun Apr 29, 2018   0558 WBC: (!) 15.48 [SL]   0558 WBC: (!) 15.48 [SL]      ED Course User Index  [SL] Filemon Hickey Jr., MD     Clinical Impression:   The primary encounter diagnosis was Vomiting and diarrhea. A diagnosis of Dehydration was also pertinent to this visit.                           Filemon Hickey Jr., MD  05/02/18 2688

## 2018-04-29 NOTE — ED NOTES
Patient ambulated to bathroom with steady gait. Patient given urine specimen cup, patient instructed on how to obtain clean catch urine specimen. Patient acknowledged understanding, denies any further questions at this time.

## 2022-05-24 ENCOUNTER — HOSPITAL ENCOUNTER (EMERGENCY)
Facility: HOSPITAL | Age: 37
Discharge: HOME | End: 2022-05-24
Attending: EMERGENCY MEDICINE
Payer: MEDICAID

## 2022-05-24 VITALS
OXYGEN SATURATION: 99 % | SYSTOLIC BLOOD PRESSURE: 111 MMHG | DIASTOLIC BLOOD PRESSURE: 61 MMHG | RESPIRATION RATE: 20 BRPM | BODY MASS INDEX: 28.19 KG/M2 | HEIGHT: 68 IN | HEART RATE: 90 BPM | TEMPERATURE: 101 F | WEIGHT: 186 LBS

## 2022-05-24 DIAGNOSIS — R05.9 COUGH: ICD-10-CM

## 2022-05-24 DIAGNOSIS — U07.1 COVID: Primary | ICD-10-CM

## 2022-05-24 LAB
ALBUMIN SERPL BCP-MCNC: 4 G/DL (ref 3.5–5.2)
ALP SERPL-CCNC: 57 U/L (ref 55–135)
ALT SERPL W/O P-5'-P-CCNC: 31 U/L (ref 10–44)
ANION GAP SERPL CALC-SCNC: 13 MMOL/L (ref 8–16)
AST SERPL-CCNC: 28 U/L (ref 10–40)
BASOPHILS # BLD AUTO: 0.03 K/UL (ref 0–0.2)
BASOPHILS NFR BLD: 0.3 % (ref 0–1.9)
BILIRUB SERPL-MCNC: 0.4 MG/DL (ref 0.1–1)
BUN SERPL-MCNC: 8 MG/DL (ref 6–20)
CALCIUM SERPL-MCNC: 9.2 MG/DL (ref 8.7–10.5)
CHLORIDE SERPL-SCNC: 108 MMOL/L (ref 95–110)
CO2 SERPL-SCNC: 15 MMOL/L (ref 23–29)
CREAT SERPL-MCNC: 0.7 MG/DL (ref 0.5–1.4)
CTP QC/QA: YES
CTP QC/QA: YES
DIFFERENTIAL METHOD: ABNORMAL
EOSINOPHIL # BLD AUTO: 0 K/UL (ref 0–0.5)
EOSINOPHIL NFR BLD: 0.2 % (ref 0–8)
ERYTHROCYTE [DISTWIDTH] IN BLOOD BY AUTOMATED COUNT: 13.2 % (ref 11.5–14.5)
EST. GFR  (AFRICAN AMERICAN): >60 ML/MIN/1.73 M^2
EST. GFR  (NON AFRICAN AMERICAN): >60 ML/MIN/1.73 M^2
GLUCOSE SERPL-MCNC: 86 MG/DL (ref 70–110)
HCT VFR BLD AUTO: 39.2 % (ref 37–48.5)
HGB BLD-MCNC: 13.2 G/DL (ref 12–16)
IMM GRANULOCYTES # BLD AUTO: 0.04 K/UL (ref 0–0.04)
IMM GRANULOCYTES NFR BLD AUTO: 0.4 % (ref 0–0.5)
LYMPHOCYTES # BLD AUTO: 0.3 K/UL (ref 1–4.8)
LYMPHOCYTES NFR BLD: 3.5 % (ref 18–48)
MCH RBC QN AUTO: 30.5 PG (ref 27–31)
MCHC RBC AUTO-ENTMCNC: 33.7 G/DL (ref 32–36)
MCV RBC AUTO: 91 FL (ref 82–98)
MONOCYTES # BLD AUTO: 0.6 K/UL (ref 0.3–1)
MONOCYTES NFR BLD: 6.3 % (ref 4–15)
NEUTROPHILS # BLD AUTO: 8.8 K/UL (ref 1.8–7.7)
NEUTROPHILS NFR BLD: 89.3 % (ref 38–73)
NRBC BLD-RTO: 0 /100 WBC
PLATELET # BLD AUTO: 239 K/UL (ref 150–450)
PMV BLD AUTO: 12 FL (ref 9.2–12.9)
POC MOLECULAR INFLUENZA A AGN: NEGATIVE
POC MOLECULAR INFLUENZA B AGN: NEGATIVE
POTASSIUM SERPL-SCNC: 4.4 MMOL/L (ref 3.5–5.1)
PROT SERPL-MCNC: 7.8 G/DL (ref 6–8.4)
RBC # BLD AUTO: 4.33 M/UL (ref 4–5.4)
SARS-COV-2 RDRP RESP QL NAA+PROBE: POSITIVE
SODIUM SERPL-SCNC: 136 MMOL/L (ref 136–145)
WBC # BLD AUTO: 9.8 K/UL (ref 3.9–12.7)

## 2022-05-24 PROCEDURE — 63600175 PHARM REV CODE 636 W HCPCS: Performed by: EMERGENCY MEDICINE

## 2022-05-24 PROCEDURE — 25000003 PHARM REV CODE 250: Performed by: EMERGENCY MEDICINE

## 2022-05-24 PROCEDURE — 99284 PR EMERGENCY DEPT VISIT,LEVEL IV: ICD-10-PCS | Mod: CR,CS,, | Performed by: EMERGENCY MEDICINE

## 2022-05-24 PROCEDURE — 87502 INFLUENZA DNA AMP PROBE: CPT

## 2022-05-24 PROCEDURE — 87389 HIV-1 AG W/HIV-1&-2 AB AG IA: CPT | Performed by: EMERGENCY MEDICINE

## 2022-05-24 PROCEDURE — 99284 EMERGENCY DEPT VISIT MOD MDM: CPT | Mod: 25

## 2022-05-24 PROCEDURE — 86803 HEPATITIS C AB TEST: CPT | Performed by: EMERGENCY MEDICINE

## 2022-05-24 PROCEDURE — 85025 COMPLETE CBC W/AUTO DIFF WBC: CPT | Performed by: EMERGENCY MEDICINE

## 2022-05-24 PROCEDURE — 99284 EMERGENCY DEPT VISIT MOD MDM: CPT | Mod: CR,CS,, | Performed by: EMERGENCY MEDICINE

## 2022-05-24 PROCEDURE — U0002 COVID-19 LAB TEST NON-CDC: HCPCS | Performed by: EMERGENCY MEDICINE

## 2022-05-24 PROCEDURE — 80053 COMPREHEN METABOLIC PANEL: CPT | Performed by: EMERGENCY MEDICINE

## 2022-05-24 PROCEDURE — 96374 THER/PROPH/DIAG INJ IV PUSH: CPT

## 2022-05-24 RX ORDER — KETOROLAC TROMETHAMINE 30 MG/ML
10 INJECTION, SOLUTION INTRAMUSCULAR; INTRAVENOUS
Status: COMPLETED | OUTPATIENT
Start: 2022-05-24 | End: 2022-05-24

## 2022-05-24 RX ORDER — PROMETHAZINE HYDROCHLORIDE 25 MG/1
25 TABLET ORAL EVERY 6 HOURS PRN
Qty: 15 TABLET | Refills: 0 | Status: SHIPPED | OUTPATIENT
Start: 2022-05-24

## 2022-05-24 RX ORDER — ACETAMINOPHEN 325 MG/1
650 TABLET ORAL
Status: COMPLETED | OUTPATIENT
Start: 2022-05-24 | End: 2022-05-24

## 2022-05-24 RX ORDER — IBUPROFEN 600 MG/1
600 TABLET ORAL EVERY 6 HOURS PRN
Qty: 20 TABLET | Refills: 0 | Status: SHIPPED | OUTPATIENT
Start: 2022-05-24

## 2022-05-24 RX ADMIN — ACETAMINOPHEN 650 MG: 325 TABLET ORAL at 07:05

## 2022-05-24 RX ADMIN — KETOROLAC TROMETHAMINE 10 MG: 30 INJECTION, SOLUTION INTRAMUSCULAR at 07:05

## 2022-05-24 NOTE — FIRST PROVIDER EVALUATION
"Medical screening exam completed.  I have conducted a focused provider triage encounter, findings are as follows:    Brief history of present illness:  37 y.o. pmhx cough, sob, and bilaetraly hand tingling. Recent sick contact w/ child at home. + Headache     Vitals:    05/24/22 1655 05/24/22 1759   BP: (!) 140/84 137/87   Pulse: 108 97   Resp: (!) 24 20   Temp: 99.4 °F (37.4 °C) (!) 100.7 °F (38.2 °C)   TempSrc: Oral Oral   SpO2: 98% 100%   Weight: 84.4 kg (186 lb)    Height: 5' 8" (1.727 m)        Pertinent physical exam:    PT appear uncomfortable,   Hyperventilating     Brief workup plan:    Cbc, cmp, CXR, covid and flu swab     Preliminary workup initiated; this workup will be continued and followed by the physician or advanced practice provider that is assigned to the patient when roomed.  "

## 2022-05-25 LAB
HCV AB SERPL QL IA: NEGATIVE
HIV 1+2 AB+HIV1 P24 AG SERPL QL IA: NEGATIVE

## 2022-05-25 NOTE — ED PROVIDER NOTES
"Encounter Date: 5/24/2022    SCRIBE #1 NOTE: I, Lucero Tatiana, am scribing for, and in the presence of,  Nba Sesay MD. I have scribed the following portions of the note -       History     Chief Complaint   Patient presents with    COVID-19 Concerns     Body aches, headache, chills, shakes, hyperventilating     38 yo W with pmhx anxiety, depression, IBS, asthma, diverticulitis presents with a chief complaint of myalgias.  Patient reports symptoms began this morning.  They are severe.  Present throughout generalized body but also headache.  Associated with fever.  Her child had a fever yesterday.  Patient tried taking medications but vomited it.  No chest pain or shortness of breath.  She has not been vaccinated for COVID-19.  She reports extreme anxiety.  She additionally merlyn has not taken her medications for anxiety because she only takes it as needed.    The history is provided by medical records and the patient. No  was used.     Review of patient's allergies indicates:   Allergen Reactions    Keflex [cephalexin] Swelling and Rash    Percocet [oxycodone-acetaminophen] Other (See Comments)     nausea, abdominal cramping - "just doesn't like taking it" - patient took morning of 2/2/2016       Past Medical History:   Diagnosis Date    Anxiety     Asthma     Depression     Diet controlled gestational diabetes mellitus (GDM) in third trimester 11/21/2017    History of diverticulitis     IBS (irritable bowel syndrome)      Past Surgical History:   Procedure Laterality Date    CERVIX SURGERY      removal of tumor    OVARIAN CYST REMOVAL       Family History   Problem Relation Age of Onset    Diabetes Paternal Grandfather     Hypertension Paternal Grandfather     Diabetes Paternal Grandmother     Hypertension Paternal Grandmother     Diabetes Maternal Grandmother     Hypertension Maternal Grandmother     Diabetes Maternal Grandfather     Hypertension Maternal Grandfather  "    Diabetes Father     Hypertension Father     Diabetes Mother     Hypertension Mother     Ovarian cancer Mother         Had hysterectomy at age 37    Diabetes Paternal Aunt     Diabetes Paternal Uncle     Breast cancer Neg Hx     Colon cancer Neg Hx      Social History     Tobacco Use    Smoking status: Current Every Day Smoker     Packs/day: 0.50     Years: 14.00     Pack years: 7.00     Types: Cigarettes    Smokeless tobacco: Never Used   Substance Use Topics    Alcohol use: No    Drug use: No     Review of Systems   Constitutional: Positive for chills. Negative for fever.   HENT: Negative for sore throat.    Respiratory: Negative for shortness of breath.    Cardiovascular: Negative for chest pain.   Gastrointestinal: Positive for vomiting. Negative for abdominal pain.   Genitourinary: Negative for dysuria.   Musculoskeletal: Positive for myalgias. Negative for back pain.   Skin: Negative for rash and wound.   Neurological: Positive for headaches. Negative for weakness and numbness.   Hematological: Does not bruise/bleed easily.   Psychiatric/Behavioral: The patient is nervous/anxious.        Physical Exam     Initial Vitals [05/24/22 1655]   BP Pulse Resp Temp SpO2   (!) 140/84 108 (!) 24 99.4 °F (37.4 °C) 98 %      MAP       --         Physical Exam    Nursing note and vitals reviewed.  Constitutional: She appears well-developed and well-nourished. She is not diaphoretic. She appears distressed.   Tearful, anxious, hyperventilating   HENT:   Head: Normocephalic and atraumatic.   Eyes: EOM are normal. Right eye exhibits no discharge. Left eye exhibits no discharge. No scleral icterus.   Neck: Neck supple. No JVD present.   No meningeal signs   Normal range of motion.  Cardiovascular: Normal rate, regular rhythm and normal heart sounds. Exam reveals no gallop and no friction rub.    No murmur heard.  Pulmonary/Chest: No respiratory distress. She has no wheezes. She has no rhonchi. She has no rales.  She exhibits no tenderness.   Speaking complete sentences on room air, hyperventilating but no wheezing   Abdominal: Abdomen is soft. She exhibits no distension and no mass. There is no abdominal tenderness. There is no rebound and no guarding.   Musculoskeletal:         General: No tenderness or edema. Normal range of motion.      Cervical back: Normal range of motion and neck supple.     Lymphadenopathy:     She has no cervical adenopathy.   Neurological: She is alert and oriented to person, place, and time.   Skin: Skin is warm and dry. Capillary refill takes less than 2 seconds.   Psychiatric: Thought content normal.   Anxious, tearful         ED Course   Procedures  Labs Reviewed   CBC W/ AUTO DIFFERENTIAL - Abnormal; Notable for the following components:       Result Value    Gran # (ANC) 8.8 (*)     Lymph # 0.3 (*)     Gran % 89.3 (*)     Lymph % 3.5 (*)     All other components within normal limits   COMPREHENSIVE METABOLIC PANEL - Abnormal; Notable for the following components:    CO2 15 (*)     All other components within normal limits   SARS-COV-2 RDRP GENE - Abnormal; Notable for the following components:    POC Rapid COVID Positive (*)     All other components within normal limits    Narrative:     This test utilizes isothermal nucleic acid amplification   technology to detect the SARS-CoV-2 RdRp nucleic acid segment.   The analytical sensitivity (limit of detection) is 125 genome   equivalents/mL.   A POSITIVE result implies infection with the SARS-CoV-2 virus;   the patient is presumed to be contagious.     A NEGATIVE result means that SARS-CoV-2 nucleic acids are not   present above the limit of detection. A NEGATIVE result should be   treated as presumptive. It does not rule out the possibility of   COVID-19 and should not be the sole basis for treatment decisions.   If COVID-19 is strongly suspected based on clinical and exposure   history, re-testing using an alternate molecular assay should be    considered.   This test is only for use under the Food and Drug   Administration s Emergency Use Authorization (EUA).   Commercial kits are provided by [employer].   Performance characteristics of the EUA have been independently   verified by [facility] Department of   Pathology and Laboratory Medicine.   _________________________________________________________________   The authorized Fact Sheet for Healthcare Providers and the authorized Fact   Sheet for Patients of the ID NOW COVID-19 are available on the FDA   website:     https://www.fda.gov/media/982678/download  https://www.fda.gov/media/927095/download           POCT INFLUENZA A/B MOLECULAR - Normal    Narrative:     This test utilizes isothermal nucleic acid amplification   technology to detect the SARS-CoV-2 RdRp nucleic acid segment.   The analytical sensitivity (limit of detection) is 125 genome   equivalents/mL.   A POSITIVE result implies infection with the SARS-CoV-2 virus;   the patient is presumed to be contagious.     A NEGATIVE result means that SARS-CoV-2 nucleic acids are not   present above the limit of detection. A NEGATIVE result should be   treated as presumptive. It does not rule out the possibility of   COVID-19 and should not be the sole basis for treatment decisions.   If COVID-19 is strongly suspected based on clinical and exposure   history, re-testing using an alternate molecular assay should be   considered.   This test is only for use under the Food and Drug   Administration s Emergency Use Authorization (EUA).   Commercial kits are provided by [employer].   Performance characteristics of the EUA have been independently   verified by [facility] Department of   Pathology and Laboratory Medicine.   _________________________________________________________________   The authorized Fact Sheet for Healthcare Providers and the authorized Fact   Sheet for Patients of the ID NOW COVID-19 are  available on the FDA   website:     https://www.fda.gov/media/587671/download  https://www.fda.gov/media/068229/download           HIV 1 / 2 ANTIBODY   HEPATITIS C ANTIBODY          Imaging Results    None          Medications   acetaminophen tablet 650 mg (650 mg Oral Given 5/24/22 1941)   ketorolac injection 9.999 mg (9.999 mg Intravenous Given 5/24/22 1942)     Medical Decision Making:   History:   Old Medical Records: I decided to obtain old medical records.  Initial Assessment:   36 yo W with pmhx anxiety, depression, IBS, asthma, diverticulitis presents with a chief complaint of myalgias.  She has a low-grade fever.   Differential Diagnosis:   COVID, dehydration, influenza  Clinical Tests:   Lab Tests: Ordered and Reviewed  Radiological Study: Reviewed and Ordered  ED Management:  Influenza negative.  COVID is positive.  That is the likely etiology the patient's symptoms.  Patient is very anxious and tearful complaining of her pain.  She is in no respiratory distress whatsoever with clear lungs.  She is satting well.  She is appropriate for outpatient management of COVID-19.  She received acetaminophen and Toradol in ED.  Script provided for Phenergan and ibuprofen with instructions for home use.  Additionally provided with prescription for Paxlovid.  Provided with extensive return precautions.  CBC without leukocytosis or anemia.  CMP with bicarb of 15, suspect secondary hyperventilation.        Scribe Attestation:   Scribe #1: I performed the above scribed service and the documentation accurately describes the services I performed. I attest to the accuracy of the note.    Attending Attestation:           Physician Attestation for Scribe:      Comments: I, Dr. Nba Sesay, personally performed the services described in this documentation. All medical record entries made by the scribe were at my direction and in my presence.  I have reviewed the chart and agree that the record reflects my personal performance  and is accurate and complete. Nba Sesay MD.  10:55 PM 05/24/2022                   Clinical Impression:   Final diagnoses:  [R05.9] Cough  [U07.1] COVID (Primary)          ED Disposition Condition    Discharge Stable        ED Prescriptions     Medication Sig Dispense Start Date End Date Auth. Provider    ibuprofen (ADVIL,MOTRIN) 600 MG tablet Take 1 tablet (600 mg total) by mouth every 6 (six) hours as needed for Pain (fever). 20 tablet 5/24/2022  Nba Sesay MD    promethazine (PHENERGAN) 25 MG tablet Take 1 tablet (25 mg total) by mouth every 6 (six) hours as needed for Nausea. 15 tablet 5/24/2022  Nba Sesay MD    nirmatrelvir-ritonavir 150 mg x 2- 100 mg copackaged tablets (EUA) Take 3 tablets by mouth 2 (two) times daily for 5 days. Each dose contains 2 nirmatrelvir (pink tablets) and 1 ritonavir (white tablet). Take all 3 tablets together 30 tablet 5/24/2022 5/29/2022 Nba Sesay MD        Follow-up Information     Follow up With Specialties Details Why Contact Info    Keena Johnson NP Hospitalist   [address]  [place] [address]  [phone]      [facility] Dept Emergency Medicine  As needed, If symptoms worsen [address]  [facility] [address] [phone]           Nba Sesay MD  05/24/22 4998

## 2022-05-25 NOTE — ED NOTES
"Patient in lobby, no mask on, reminded patient to please wear mask, observed in room with no mask in place. Again requested patient to wear mask, states " no, I cant breathe with it on" Update to physician and CN,.   "

## 2022-05-25 NOTE — DISCHARGE INSTRUCTIONS
Take ibuprofen as needed for pain.  Drink plenty of fluids.  Take Phenergan as needed for nausea.  Take Paxlovid to prevent complications of COVID-19.  Return to the ER for any worsening shortness of breath or other concerning symptoms.

## 2024-06-05 NOTE — ED NOTES
Addended by: BHARGAV NICOLE on: 6/5/2024 01:54 PM     Modules accepted: Orders     Headache remains 9:10, denies nausea, vomiting. Skin is dry and pink. Call light in reach, family at the bedside.

## 2025-07-28 ENCOUNTER — HOSPITAL ENCOUNTER (EMERGENCY)
Facility: HOSPITAL | Age: 40
Discharge: HOME | End: 2025-07-28
Attending: EMERGENCY MEDICINE

## 2025-07-28 VITALS
HEIGHT: 68 IN | HEART RATE: 86 BPM | WEIGHT: 155 LBS | BODY MASS INDEX: 23.49 KG/M2 | OXYGEN SATURATION: 99 % | RESPIRATION RATE: 17 BRPM | DIASTOLIC BLOOD PRESSURE: 52 MMHG | TEMPERATURE: 99 F | SYSTOLIC BLOOD PRESSURE: 101 MMHG

## 2025-07-28 DIAGNOSIS — N30.01 ACUTE CYSTITIS WITH HEMATURIA: Primary | ICD-10-CM

## 2025-07-28 DIAGNOSIS — M54.50 BILATERAL LOW BACK PAIN, UNSPECIFIED CHRONICITY, UNSPECIFIED WHETHER SCIATICA PRESENT: ICD-10-CM

## 2025-07-28 LAB
ALBUMIN SERPL-MCNC: 3.9 G/DL (ref 3.3–5.5)
ALP SERPL-CCNC: 67 U/L (ref 42–141)
B-HCG UR QL: NEGATIVE
BILIRUB SERPL-MCNC: 0.5 MG/DL (ref 0.2–1.6)
BILIRUBIN, POC UA: NEGATIVE
BLOOD, POC UA: ABNORMAL
BUN SERPL-MCNC: 7 MG/DL (ref 7–22)
CALCIUM SERPL-MCNC: 9.3 MG/DL (ref 8–10.3)
CHLORIDE SERPL-SCNC: 107 MMOL/L (ref 98–108)
CLARITY, UA: CLEAR
COLOR, UA: YELLOW
CREAT SERPL-MCNC: 0.7 MG/DL (ref 0.6–1.2)
CTP QC/QA: YES
GLUCOSE SERPL-MCNC: 94 MG/DL (ref 73–118)
GLUCOSE, POC UA: NEGATIVE
HCT, POC: NORMAL
HGB, POC: NORMAL (ref 14–18)
KETONES, POC UA: NEGATIVE
LEUKOCYTE EST, POC UA: NEGATIVE
MCH, POC: NORMAL
MCHC, POC: NORMAL
MCV, POC: NORMAL
MPV, POC: NORMAL
NITRITE, POC UA: POSITIVE
PH UR STRIP: 6 [PH] (ref 5–8)
POC ALT (SGPT): 13 U/L (ref 10–47)
POC AST (SGOT): 16 U/L (ref 11–38)
POC PLATELET COUNT: NORMAL
POC TCO2: 25 MMOL/L (ref 18–33)
POTASSIUM BLD-SCNC: 3.8 MMOL/L (ref 3.6–5.1)
PROTEIN, POC UA: ABNORMAL
PROTEIN, POC: 6.6 G/DL (ref 6.4–8.1)
RBC, POC: NORMAL
RDW, POC: NORMAL
SODIUM BLD-SCNC: 138 MMOL/L (ref 128–145)
SPECIFIC GRAVITY, POC UA: 1.02 (ref 1–1.03)
UROBILINOGEN, POC UA: 0.2 E.U./DL
WBC, POC: NORMAL

## 2025-07-28 PROCEDURE — 81025 URINE PREGNANCY TEST: CPT | Mod: ER | Performed by: NURSE PRACTITIONER

## 2025-07-28 PROCEDURE — 99284 EMERGENCY DEPT VISIT MOD MDM: CPT | Mod: 25,ER

## 2025-07-28 PROCEDURE — 25000003 PHARM REV CODE 250: Mod: ER | Performed by: NURSE PRACTITIONER

## 2025-07-28 PROCEDURE — 80053 COMPREHEN METABOLIC PANEL: CPT | Mod: ER

## 2025-07-28 PROCEDURE — 96361 HYDRATE IV INFUSION ADD-ON: CPT | Mod: ER

## 2025-07-28 PROCEDURE — 96374 THER/PROPH/DIAG INJ IV PUSH: CPT | Mod: ER

## 2025-07-28 PROCEDURE — 87077 CULTURE AEROBIC IDENTIFY: CPT | Performed by: NURSE PRACTITIONER

## 2025-07-28 PROCEDURE — 96375 TX/PRO/DX INJ NEW DRUG ADDON: CPT | Mod: ER

## 2025-07-28 PROCEDURE — 63600175 PHARM REV CODE 636 W HCPCS: Mod: ER | Performed by: NURSE PRACTITIONER

## 2025-07-28 PROCEDURE — 85025 COMPLETE CBC W/AUTO DIFF WBC: CPT | Mod: ER

## 2025-07-28 PROCEDURE — 81025 URINE PREGNANCY TEST: CPT | Mod: ER

## 2025-07-28 RX ORDER — ORPHENADRINE CITRATE 100 MG/1
100 TABLET, EXTENDED RELEASE ORAL 2 TIMES DAILY PRN
Qty: 20 TABLET | Refills: 0 | Status: SHIPPED | OUTPATIENT
Start: 2025-07-28 | End: 2025-08-07

## 2025-07-28 RX ORDER — ONDANSETRON HYDROCHLORIDE 2 MG/ML
4 INJECTION, SOLUTION INTRAVENOUS
Status: COMPLETED | OUTPATIENT
Start: 2025-07-28 | End: 2025-07-28

## 2025-07-28 RX ORDER — LIDOCAINE 50 MG/G
1 PATCH TOPICAL DAILY
Qty: 15 PATCH | Refills: 0 | Status: SHIPPED | OUTPATIENT
Start: 2025-07-28

## 2025-07-28 RX ORDER — KETOROLAC TROMETHAMINE 30 MG/ML
10 INJECTION, SOLUTION INTRAMUSCULAR; INTRAVENOUS
Status: COMPLETED | OUTPATIENT
Start: 2025-07-28 | End: 2025-07-28

## 2025-07-28 RX ORDER — NITROFURANTOIN 25; 75 MG/1; MG/1
100 CAPSULE ORAL 2 TIMES DAILY
Qty: 10 CAPSULE | Refills: 0 | Status: SHIPPED | OUTPATIENT
Start: 2025-07-29 | End: 2025-08-03

## 2025-07-28 RX ORDER — LIDOCAINE 50 MG/G
1 PATCH TOPICAL
Status: DISCONTINUED | OUTPATIENT
Start: 2025-07-28 | End: 2025-07-28 | Stop reason: HOSPADM

## 2025-07-28 RX ORDER — FLUCONAZOLE 150 MG/1
150 TABLET ORAL DAILY
Qty: 1 TABLET | Refills: 0 | Status: SHIPPED | OUTPATIENT
Start: 2025-07-28 | End: 2025-07-29

## 2025-07-28 RX ORDER — NITROFURANTOIN 25; 75 MG/1; MG/1
100 CAPSULE ORAL
Status: COMPLETED | OUTPATIENT
Start: 2025-07-28 | End: 2025-07-28

## 2025-07-28 RX ORDER — ORPHENADRINE CITRATE 100 MG/1
100 TABLET, EXTENDED RELEASE ORAL
Status: COMPLETED | OUTPATIENT
Start: 2025-07-28 | End: 2025-07-28

## 2025-07-28 RX ADMIN — ONDANSETRON 4 MG: 2 INJECTION INTRAMUSCULAR; INTRAVENOUS at 07:07

## 2025-07-28 RX ADMIN — LIDOCAINE 1 PATCH: 50 PATCH TOPICAL at 08:07

## 2025-07-28 RX ADMIN — ORPHENADRINE CITRATE 100 MG: 100 TABLET, EXTENDED RELEASE ORAL at 08:07

## 2025-07-28 RX ADMIN — KETOROLAC TROMETHAMINE 10 MG: 30 INJECTION, SOLUTION INTRAMUSCULAR; INTRAVENOUS at 07:07

## 2025-07-28 RX ADMIN — NITROFURANTOIN MONOHYDRATE/MACROCRYSTALLINE 100 MG: 25; 75 CAPSULE ORAL at 08:07

## 2025-07-28 RX ADMIN — SODIUM CHLORIDE 1000 ML: 9 INJECTION, SOLUTION INTRAVENOUS at 07:07

## 2025-07-28 NOTE — ED PROVIDER NOTES
"Encounter Date: 7/28/2025    SCRIBE #1 NOTE: I, Nisa Mcfadden, am scribing for, and in the presence of,  Lima Carney NP. I have scribed the following portions of the note - Other sections scribed: HPI,ROS.       History     Chief Complaint   Patient presents with    Abdominal Pain     Reports acute onset abd pain with radiation to lower back.     CC: low back pain    Alena Willis is a 40 y.o. female, with a PMHx of asthma, diverticulitis, and IBS, who presents to the ED with a CC of low back pain x 1 day The patient reports the back pain feels sharp and intermittently radiates into her lower abdomen and upper thighs. Patient reports additional symptoms of headache, lightheadedness, malodorous urine, and increased perspiration. The patient reports she tried to lay down to alleviate symptoms without relief. The patient reports attempted Tx with Ibuprofen. No other exacerbating or alleviating factors. The patient denies any dysuria, nausea, vomiting, bowel or bladder incontinence, saddle anesthesia, or other associated symptoms.     The history is provided by the patient. No  was used.     Review of patient's allergies indicates:   Allergen Reactions    Keflex [cephalexin] Swelling and Rash    Percocet [oxycodone-acetaminophen] Other (See Comments)     nausea, abdominal cramping - "just doesn't like taking it" - patient took morning of 2/2/2016       Past Medical History:   Diagnosis Date    Anxiety     Asthma     Depression     Diet controlled gestational diabetes mellitus (GDM) in third trimester 11/21/2017    History of diverticulitis     IBS (irritable bowel syndrome)      Past Surgical History:   Procedure Laterality Date    CERVIX SURGERY      removal of tumor    OVARIAN CYST REMOVAL       Family History   Problem Relation Name Age of Onset    Diabetes Paternal Grandfather      Hypertension Paternal Grandfather      Diabetes Paternal Grandmother      Hypertension Paternal Grandmother   "    Diabetes Maternal Grandmother      Hypertension Maternal Grandmother      Diabetes Maternal Grandfather      Hypertension Maternal Grandfather      Diabetes Father      Hypertension Father      Diabetes Mother      Hypertension Mother      Ovarian cancer Mother          Had hysterectomy at age 37    Diabetes Paternal Aunt      Diabetes Paternal Uncle      Breast cancer Neg Hx      Colon cancer Neg Hx       Social History[1]  Review of Systems   Constitutional:  Negative for fever.   HENT:  Negative for congestion and sore throat.    Respiratory:  Negative for shortness of breath.    Cardiovascular:  Negative for chest pain.   Gastrointestinal:  Positive for abdominal pain. Negative for nausea and vomiting.   Genitourinary:  Negative for decreased urine volume, dysuria, flank pain, frequency and urgency.   Musculoskeletal:  Positive for back pain.   Skin:  Negative for rash.   Neurological:  Positive for light-headedness and headaches. Negative for weakness.   Hematological:  Does not bruise/bleed easily.       Physical Exam     Initial Vitals [07/28/25 1811]   BP Pulse Resp Temp SpO2   104/70 98 15 99 °F (37.2 °C) 97 %      MAP       --         Physical Exam    Constitutional: She appears well-developed and well-nourished. She is not diaphoretic. No distress.   HENT:   Head: Normocephalic and atraumatic.   Neck:   Normal range of motion.  Cardiovascular:  Normal rate, regular rhythm, normal heart sounds and intact distal pulses.           Pulmonary/Chest: Breath sounds normal. No respiratory distress.   Abdominal: Abdomen is soft. Bowel sounds are normal. There is no abdominal tenderness.   Abdomen is soft.  Nontender.  No guarding or rigidity.  No CVA tenderness.   No right CVA tenderness.  No left CVA tenderness.   Musculoskeletal:         General: Normal range of motion.      Cervical back: Normal and normal range of motion.      Thoracic back: Normal.      Lumbar back: Tenderness present.      Comments:  Tenderness with palpation of the paraspinal lumbar musculature.  No midline tenderness.  No rashes or lesions.  5/5 strength of the bilateral upper and lower extremities with sensation intact.     Neurological: She is alert and oriented to person, place, and time.   Skin: Skin is warm and dry.   Psychiatric: She has a normal mood and affect. Her behavior is normal.         ED Course   Procedures  Labs Reviewed   POCT URINALYSIS W/O SCOPE - Abnormal       Result Value    Glucose, UA Negative      Bilirubin, UA Negative      Ketones, UA Negative      Spec Grav UA 1.025      Blood, UA 2+ (*)     PH, UA 6.0      Protein, UA 1+ (*)     Urobilinogen, UA 0.2      Nitrite, UA Positive (*)     Leukocytes, UA Negative      Color, UA POC Yellow      Clarity, UA, POC Clear     CULTURE, URINE   POCT URINE PREGNANCY    POC Preg Test, Ur Negative       Acceptable Yes     POCT CBC    Hematocrit        Hemoglobin        RBC        WBC        MCV        MCH, POC        MCHC        RDW-CV        Platelet Count, POC        MPV       POCT URINALYSIS W/O SCOPE   POCT CMP   POCT CMP    Albumin, POC 3.9      Alkaline Phosphatase, POC 67      ALT (SGPT), POC 13      AST (SGOT), POC 16      POC BUN 7      Calcium, POC 9.3      POC Chloride 107      POC Creatinine 0.7      POC Glucose 94      POC Potassium 3.8      POC Sodium 138      Bilirubin, POC 0.5      POC TCO2 25      Protein, POC 6.6            Imaging Results    None          Medications   LIDOcaine 5 % patch 1 patch (1 patch Transdermal Patch Applied 7/28/25 2036)   sodium chloride 0.9% bolus 1,000 mL 1,000 mL (0 mLs Intravenous Stopped 7/28/25 2012)   ketorolac injection 9.999 mg (9.999 mg Intravenous Given 7/28/25 1911)   ondansetron injection 4 mg (4 mg Intravenous Given 7/28/25 1910)   orphenadrine 12 hr tablet 100 mg (100 mg Oral Given 7/28/25 2030)   nitrofurantoin (macrocrystal-monohydrate) 100 MG capsule 100 mg (100 mg Oral Given 7/28/25 2036)     Medical  Decision Making  This is an evaluation of a 40 y.o. female that presents to the Emergency Department for low back pain.  Denies fever, rash, night sweats, weight loss,  bowel/bladder incontinence. The patient is a non-toxic, afebrile, and well appearing female. On physical exam, there is tenderness with palpation of the paraspinal lumbar musculature. There is no abdominal pain to palpation, CVA tenderness, saddle anesthesia. Reflexes and sensation are symmetric bilaterally.      Vital signs reassuring. RESULTS:   UPT negative.  UA with infection.  Urine culture pending.  White blood cell 12.1; afebrile.  No tachycardia.  Normal renal function on CMP.  Symptoms improved after fluids and medications.  Will treat UTI with Macrobid.  Do not suspect pyelonephritis or kidney stone.    Given the above findings, I do not think the patient has acute vertebral fracture, subluxation, dislocation, epidural abscess, cauda equina, shingles.    ED return precautions were discussed and understanding was verbalized. All questions or concerns have been addressed.     Amount and/or Complexity of Data Reviewed  Labs: ordered. Decision-making details documented in ED Course.    Risk  Prescription drug management.            Scribe Attestation:   Scribe #1: I performed the above scribed service and the documentation accurately describes the services I performed. I attest to the accuracy of the note.                                   Clinical Impression:  Final diagnoses:  [N30.01] Acute cystitis with hematuria (Primary)  [M54.50] Bilateral low back pain, unspecified chronicity, unspecified whether sciatica present       IQUE, personally performed the services described in this documentation. All medical record entries made by the scribe were at my direction and in my presence. I have reviewed the chart and agree that the record reflects my personal performance and is accurate and complete.    ED Disposition Condition    Discharge  Stable          ED Prescriptions       Medication Sig Dispense Start Date End Date Auth. Provider    nitrofurantoin, macrocrystal-monohydrate, (MACROBID) 100 MG capsule Take 1 capsule (100 mg total) by mouth 2 (two) times daily. for 5 days 10 capsule 7/29/2025 8/3/2025 Lima Carney NP    orphenadrine (NORFLEX) 100 mg tablet Take 1 tablet (100 mg total) by mouth 2 (two) times daily as needed for Muscle spasms or Pain. 20 tablet 7/28/2025 8/7/2025 Lima Carney NP    LIDOcaine (LIDODERM) 5 % Place 1 patch onto the skin once daily. Remove & Discard patch within 12 hours or as directed by MD 15 patch 7/28/2025 -- Lima Carney NP    fluconazole (DIFLUCAN) 150 MG Tab Take 1 tablet (150 mg total) by mouth once daily. for 1 day 1 tablet 7/28/2025 7/29/2025 Lima Carney NP          Follow-up Information       Follow up With Specialties Details Why Contact Info    Keena Johnson NP Hospitalist Schedule an appointment as soon as possible for a visit  For follow-up [address] [place] [address] [place] [address]  [phone]      [facility] ED Emergency Medicine Go to  If symptoms worsen [address] [facility] [id number] [phone]                   [1]   Social History  Tobacco Use    Smoking status: Every Day     Current packs/day: 0.50     Average packs/day: 0.5 packs/day for 14.0 years (7.0 ttl pk-yrs)     Types: Cigarettes    Smokeless tobacco: Never   Substance Use Topics    Alcohol use: No    Drug use: No        Lima Carney NP  07/28/25 2077

## 2025-07-29 NOTE — DISCHARGE INSTRUCTIONS
You have been prescribed NORFLEX for pain. Please do not take this medication while working, drinking alcohol, swimming, or while driving/operating heavy machinery. This medication may cause drowsiness, impair judgment, and reduce physical capabilities.    Thank you for coming to our Emergency Department today. It is important to remember that some problems or medical conditions are difficult to diagnose and may not be found during your Emergency Department visit.     Be sure to follow up with your primary care doctor and review all labs/imaging/tests that were performed during your ER visit with them. Some labs/tests may be outside of the normal range and require non-emergent follow-up and further investigation to help diagnose/exclude/prevent complications or other potentially serious medical conditions that were not addressed during your ER visit.    If you do not have a primary care doctor, you may contact the one listed on your discharge paperwork or you may also call the [facility] Appointment Desk at [phone] to schedule an appointment and establish care with one. It is important to your health that you have a primary care doctor.    Please take all medications as directed. All medications may potentially have side-effects and it is impossible to predict which medications may give you side-effects or what side-effects (if any) they will give you.. If you feel that you are having a negative effect or side-effect of any medication you should immediately stop taking them and seek medical attention. If you feel that you are having a life-threatening reaction call 911.    Return to the ER with any questions/concerns, new/concerning symptoms, worsening or failure to improve.     Do not drive, swim, climb to height, take a bath, operate heavy machinery, drink alcohol or take potentially sedating medications, sign any legal documents or make any important decisions for 24 hours if you have received any pain  medications, sedatives or mood altering drugs during your ER visit or within 24 hours of taking them if they have been prescribed to you.     You can find additional resources for Dentists, hearing aids, durable medical equipment, low cost pharmacies and other resources at [email or web address]    BELOW THIS LINE ONLY APPLIES IF YOU HAVE A COVID TEST PENDING OR IF YOU HAVE BEEN DIAGNOSED WITH COVID:  Please access [facility] to review the results of your test. Until the results of your COVID test return, you should isolate yourself so as not to potentially spread illness to others.   If your COVID test returns positive, you should isolate yourself so as not to spread illness to others. After five full days, if you are feeling better and you have not had fever for 24 hours, you can return to your typical daily activities, but you must wear a mask around others for an additional 5 days.   If your COVID test returns negative and you are either unvaccinated or more than six months out from your two-dose vaccine and are not yet boosted, you should still quarantine for 5 full days followed by strict mask use for an additional 5 full days.   If your COVID test returns negative and you have received your 2-dose initial vaccine as well as a booster, you should continue strict mask use for 10 full days after the exposure.  For all those exposed, best practice includes a test at day 5 after the exposure. This can be a home test or a test through one of the many testing centers throughout our community.   Masking is always advised to limit the spread of COVID. Cdc.gov is an excellent site to obtain the latest up to date recommendations regarding COVID and COVID testing.     CDC Testing and Quarantine Guidelines for patients with exposure to a known-positive COVID-19 person:  A close exposure is defined as anyone who has had an exposure (masked or unmasked) to a known COVID -19 positive person within 6 feet of someone for a  cumulative total of 15 minutes or more over a 24-hour period.   Vaccinated and/or if you recently had a positive covid test within 90 days do NOT need to quarantine after contact with someone who had COVID-19 unless you develop symptoms.   Fully vaccinated people who have not had a positive test within 90 days, should get tested 3-5 days after their exposure, even if they don't have symptoms and wear a mask indoors in public for 14 days following exposure or until their test result is negative.      Unvaccinated and/or NOT had a positive test within 90 days and meet close exposure  You are required by CDC guidelines to quarantine for at least 5 days from time of exposure followed by 5 days of strict masking. It is recommended, but not required to test after 5 days, unless you develop symptoms, in which case you should test at that time.  If you get tested after 5 days and your test is positive, your 5 day period of isolation starts the day of the positive test.    If your exposure does not meet the above definition, you can return to your normal daily activities to include social distancing, wearing a mask and frequent handwashing.      Here is a link to guidance from the CDC:  https://www.cdc.gov/media/releases/2021/s1227-isolation-quarantine-guidance.html      [facility]  Health Testing Sites:  https://ldh.[place].gov/page/3934      [employer] website with testing locations and guidance:  [email or web address]

## 2025-07-31 LAB — BACTERIA UR CULT: ABNORMAL

## 2025-08-09 ENCOUNTER — HOSPITAL ENCOUNTER (EMERGENCY)
Facility: HOSPITAL | Age: 40
Discharge: HOME | End: 2025-08-09
Attending: EMERGENCY MEDICINE

## 2025-08-09 VITALS
TEMPERATURE: 99 F | SYSTOLIC BLOOD PRESSURE: 100 MMHG | OXYGEN SATURATION: 97 % | BODY MASS INDEX: 23.49 KG/M2 | HEART RATE: 90 BPM | HEIGHT: 68 IN | WEIGHT: 155 LBS | DIASTOLIC BLOOD PRESSURE: 59 MMHG | RESPIRATION RATE: 16 BRPM

## 2025-08-09 DIAGNOSIS — Z76.89 ENCOUNTER FOR INCISION AND DRAINAGE PROCEDURE: ICD-10-CM

## 2025-08-09 DIAGNOSIS — L02.91 ABSCESS: Primary | ICD-10-CM

## 2025-08-09 LAB
B-HCG UR QL: NEGATIVE
CTP QC/QA: YES

## 2025-08-09 PROCEDURE — 99284 EMERGENCY DEPT VISIT MOD MDM: CPT | Mod: ER,25

## 2025-08-09 PROCEDURE — 63600175 PHARM REV CODE 636 W HCPCS: Mod: ER

## 2025-08-09 PROCEDURE — 10060 I&D ABSCESS SIMPLE/SINGLE: CPT | Mod: ER

## 2025-08-09 PROCEDURE — 81025 URINE PREGNANCY TEST: CPT | Mod: ER

## 2025-08-09 RX ORDER — MELOXICAM 15 MG/1
15 TABLET ORAL DAILY
Qty: 5 TABLET | Refills: 0 | Status: SHIPPED | OUTPATIENT
Start: 2025-08-09 | End: 2025-08-14

## 2025-08-09 RX ORDER — LIDOCAINE HYDROCHLORIDE 10 MG/ML
5 INJECTION, SOLUTION INFILTRATION; PERINEURAL
Status: COMPLETED | OUTPATIENT
Start: 2025-08-09 | End: 2025-08-09

## 2025-08-09 RX ORDER — DOXYCYCLINE 100 MG/1
100 CAPSULE ORAL 2 TIMES DAILY
Qty: 10 CAPSULE | Refills: 0 | Status: SHIPPED | OUTPATIENT
Start: 2025-08-09 | End: 2025-08-14

## 2025-08-09 RX ADMIN — LIDOCAINE HYDROCHLORIDE 5 ML: 10 INJECTION, SOLUTION INFILTRATION; PERINEURAL at 03:08
